# Patient Record
Sex: FEMALE | Race: WHITE | NOT HISPANIC OR LATINO | Employment: OTHER | URBAN - METROPOLITAN AREA
[De-identification: names, ages, dates, MRNs, and addresses within clinical notes are randomized per-mention and may not be internally consistent; named-entity substitution may affect disease eponyms.]

---

## 2018-10-16 ENCOUNTER — TRANSCRIBE ORDERS (OUTPATIENT)
Dept: ADMINISTRATIVE | Facility: HOSPITAL | Age: 83
End: 2018-10-16

## 2018-10-16 DIAGNOSIS — R22.31 MASS OF ARM, RIGHT: Primary | ICD-10-CM

## 2018-10-30 ENCOUNTER — HOSPITAL ENCOUNTER (OUTPATIENT)
Dept: MRI IMAGING | Facility: HOSPITAL | Age: 83
Discharge: HOME | End: 2018-10-30
Attending: PLASTIC SURGERY | Admitting: PLASTIC SURGERY

## 2018-10-30 DIAGNOSIS — R22.31 MASS OF ARM, RIGHT: ICD-10-CM

## 2018-10-30 PROCEDURE — 0 GADOBENATE DIMEGLUMINE 529 MG/ML SOLUTION: Performed by: PLASTIC SURGERY

## 2018-10-30 PROCEDURE — A9577 INJ MULTIHANCE: HCPCS | Performed by: PLASTIC SURGERY

## 2018-10-30 PROCEDURE — 73220 MRI UPPR EXTREMITY W/O&W/DYE: CPT

## 2018-10-30 PROCEDURE — 82565 ASSAY OF CREATININE: CPT

## 2018-10-30 RX ADMIN — GADOBENATE DIMEGLUMINE 10 ML: 529 INJECTION, SOLUTION INTRAVENOUS at 18:19

## 2018-11-01 LAB — CREAT BLDA-MCNC: 1.2 MG/DL (ref 0.6–1.3)

## 2018-11-03 ENCOUNTER — HOSPITAL ENCOUNTER (INPATIENT)
Facility: HOSPITAL | Age: 83
LOS: 4 days | Discharge: NURSING, REHAB OR LONG-TERM CARE | End: 2018-11-07
Attending: EMERGENCY MEDICINE | Admitting: HOSPITALIST

## 2018-11-03 ENCOUNTER — APPOINTMENT (OUTPATIENT)
Dept: GENERAL RADIOLOGY | Facility: HOSPITAL | Age: 83
End: 2018-11-03

## 2018-11-03 DIAGNOSIS — S72.002A CLOSED DISPLACED FRACTURE OF LEFT FEMORAL NECK (HCC): Primary | ICD-10-CM
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
## 2018-11-03 DIAGNOSIS — R26.2 DIFFICULTY WALKING: ICD-10-CM

## 2018-11-03 LAB
ALBUMIN SERPL-MCNC: 4.4 G/DL (ref 3.5–5.2)
ALBUMIN/GLOB SERPL: 1.4 G/DL
ALP SERPL-CCNC: 65 U/L (ref 39–117)
ALT SERPL W P-5'-P-CCNC: 26 U/L (ref 1–33)
ANION GAP SERPL CALCULATED.3IONS-SCNC: 12.8 MMOL/L
AST SERPL-CCNC: 25 U/L (ref 1–32)
BASOPHILS # BLD AUTO: 0.03 10*3/MM3 (ref 0–0.2)
BASOPHILS NFR BLD AUTO: 0.2 % (ref 0–1.5)
BILIRUB SERPL-MCNC: 0.4 MG/DL (ref 0.1–1.2)
BUN BLD-MCNC: 20 MG/DL (ref 8–23)
BUN/CREAT SERPL: 20 (ref 7–25)
CALCIUM SPEC-SCNC: 9.4 MG/DL (ref 8.6–10.5)
CHLORIDE SERPL-SCNC: 102 MMOL/L (ref 98–107)
CO2 SERPL-SCNC: 26.2 MMOL/L (ref 22–29)
CREAT BLD-MCNC: 1 MG/DL (ref 0.57–1)
DEPRECATED RDW RBC AUTO: 45.2 FL (ref 37–54)
EOSINOPHIL # BLD AUTO: 0.1 10*3/MM3 (ref 0–0.7)
EOSINOPHIL NFR BLD AUTO: 0.7 % (ref 0.3–6.2)
ERYTHROCYTE [DISTWIDTH] IN BLOOD BY AUTOMATED COUNT: 13.6 % (ref 11.7–13)
GFR SERPL CREATININE-BSD FRML MDRD: 53 ML/MIN/1.73
GLOBULIN UR ELPH-MCNC: 3.1 GM/DL
GLUCOSE BLD-MCNC: 113 MG/DL (ref 65–99)
HCT VFR BLD AUTO: 40.9 % (ref 35.6–45.5)
HGB BLD-MCNC: 13.5 G/DL (ref 11.9–15.5)
IMM GRANULOCYTES # BLD: 0.05 10*3/MM3 (ref 0–0.03)
IMM GRANULOCYTES NFR BLD: 0.4 % (ref 0–0.5)
INR PPP: 1.02 (ref 0.9–1.1)
LYMPHOCYTES # BLD AUTO: 1.12 10*3/MM3 (ref 0.9–4.8)
LYMPHOCYTES NFR BLD AUTO: 8 % (ref 19.6–45.3)
MCH RBC QN AUTO: 30.3 PG (ref 26.9–32)
MCHC RBC AUTO-ENTMCNC: 33 G/DL (ref 32.4–36.3)
MCV RBC AUTO: 91.9 FL (ref 80.5–98.2)
MONOCYTES # BLD AUTO: 0.79 10*3/MM3 (ref 0.2–1.2)
MONOCYTES NFR BLD AUTO: 5.6 % (ref 5–12)
NEUTROPHILS # BLD AUTO: 11.98 10*3/MM3 (ref 1.9–8.1)
NEUTROPHILS NFR BLD AUTO: 85.5 % (ref 42.7–76)
PLATELET # BLD AUTO: 277 10*3/MM3 (ref 140–500)
PMV BLD AUTO: 9.1 FL (ref 6–12)
POTASSIUM BLD-SCNC: 4 MMOL/L (ref 3.5–5.2)
PROT SERPL-MCNC: 7.5 G/DL (ref 6–8.5)
PROTHROMBIN TIME: 13.2 SECONDS (ref 11.7–14.2)
RBC # BLD AUTO: 4.45 10*6/MM3 (ref 3.9–5.2)
SODIUM BLD-SCNC: 141 MMOL/L (ref 136–145)
WBC NRBC COR # BLD: 14.02 10*3/MM3 (ref 4.5–10.7)

## 2018-11-03 PROCEDURE — 93005 ELECTROCARDIOGRAM TRACING: CPT | Performed by: EMERGENCY MEDICINE

## 2018-11-03 PROCEDURE — 73502 X-RAY EXAM HIP UNI 2-3 VIEWS: CPT

## 2018-11-03 PROCEDURE — 36415 COLL VENOUS BLD VENIPUNCTURE: CPT

## 2018-11-03 PROCEDURE — 73552 X-RAY EXAM OF FEMUR 2/>: CPT

## 2018-11-03 PROCEDURE — 71045 X-RAY EXAM CHEST 1 VIEW: CPT

## 2018-11-03 PROCEDURE — 25010000002 MORPHINE PER 10 MG: Performed by: EMERGENCY MEDICINE

## 2018-11-03 PROCEDURE — 85610 PROTHROMBIN TIME: CPT | Performed by: EMERGENCY MEDICINE

## 2018-11-03 PROCEDURE — 93010 ELECTROCARDIOGRAM REPORT: CPT | Performed by: INTERNAL MEDICINE

## 2018-11-03 PROCEDURE — 85025 COMPLETE CBC W/AUTO DIFF WBC: CPT | Performed by: EMERGENCY MEDICINE

## 2018-11-03 PROCEDURE — 73130 X-RAY EXAM OF HAND: CPT

## 2018-11-03 PROCEDURE — 80053 COMPREHEN METABOLIC PANEL: CPT | Performed by: EMERGENCY MEDICINE

## 2018-11-03 PROCEDURE — 99285 EMERGENCY DEPT VISIT HI MDM: CPT

## 2018-11-03 PROCEDURE — 25010000002 ONDANSETRON PER 1 MG: Performed by: EMERGENCY MEDICINE

## 2018-11-03 RX ORDER — ONDANSETRON 2 MG/ML
4 INJECTION INTRAMUSCULAR; INTRAVENOUS ONCE
Status: COMPLETED | OUTPATIENT
Start: 2018-11-03 | End: 2018-11-03

## 2018-11-03 RX ORDER — SODIUM CHLORIDE 0.9 % (FLUSH) 0.9 %
10 SYRINGE (ML) INJECTION AS NEEDED
Status: DISCONTINUED | OUTPATIENT
Start: 2018-11-03 | End: 2018-11-07

## 2018-11-03 RX ORDER — ATORVASTATIN CALCIUM 10 MG/1
10 TABLET, FILM COATED ORAL DAILY
COMMUNITY
End: 2019-06-25 | Stop reason: SDUPTHER

## 2018-11-03 RX ORDER — MORPHINE SULFATE 2 MG/ML
4 INJECTION, SOLUTION INTRAMUSCULAR; INTRAVENOUS ONCE
Status: COMPLETED | OUTPATIENT
Start: 2018-11-03 | End: 2018-11-03

## 2018-11-03 RX ORDER — CETIRIZINE HYDROCHLORIDE 10 MG/1
10 TABLET ORAL DAILY
COMMUNITY
End: 2019-12-23 | Stop reason: SDUPTHER

## 2018-11-03 RX ORDER — ASPIRIN 81 MG/1
81 TABLET, CHEWABLE ORAL DAILY
COMMUNITY
End: 2022-05-09

## 2018-11-03 RX ORDER — MORPHINE SULFATE 2 MG/ML
2 INJECTION, SOLUTION INTRAMUSCULAR; INTRAVENOUS ONCE
Status: COMPLETED | OUTPATIENT
Start: 2018-11-03 | End: 2018-11-03

## 2018-11-03 RX ORDER — METOCLOPRAMIDE HYDROCHLORIDE 5 MG/ML
10 INJECTION INTRAMUSCULAR; INTRAVENOUS ONCE
Status: DISCONTINUED | OUTPATIENT
Start: 2018-11-03 | End: 2018-11-03

## 2018-11-03 RX ADMIN — MORPHINE SULFATE 4 MG: 2 INJECTION, SOLUTION INTRAMUSCULAR; INTRAVENOUS at 19:09

## 2018-11-03 RX ADMIN — MORPHINE SULFATE 2 MG: 2 INJECTION, SOLUTION INTRAMUSCULAR; INTRAVENOUS at 22:55

## 2018-11-03 RX ADMIN — ONDANSETRON 4 MG: 2 INJECTION INTRAMUSCULAR; INTRAVENOUS at 19:09

## 2018-11-03 NOTE — ED PROVIDER NOTES
[facility] EMERGENCY DEPARTMENT ENCOUNTER    CHIEF COMPLAINT  Chief Complaint: leg pain  History given by: patient  History limited by: none  [facility] Room Number: 35/35  PMD: Thuy Garza MD      HPI:  Pt is a 83 y.o. female who presents complaining of LLE pain s/p fall down approximately 6 steps at 1630.  She states that she slipped and fell on to her left hip and her left hand but denies hitting her head.  She also c/o L hand pain. She denies LOC, abd pain, neck pain, and back pain.  She states that she has been unable to bear weight on her left and pulled herself up the stairs so that she could call for help.     Onset: sudden  Duration: pta  Severity: moderate  Associated symptoms: L hand pain  Previous treatment: none    PAST MEDICAL HISTORY  Active Ambulatory Problems     Diagnosis Date Noted   • No Active Ambulatory Problems     Resolved Ambulatory Problems     Diagnosis Date Noted   • No Resolved Ambulatory Problems     Past Medical History:   Diagnosis Date   • Hyperlipidemia        PAST SURGICAL HISTORY  Past Surgical History:   Procedure Laterality Date   • REPLACEMENT TOTAL HIP ONCOLOGIC Right        FAMILY HISTORY  History reviewed. No pertinent family history.    SOCIAL HISTORY  Social History     Social History   • Marital status:      Spouse name: N/A   • Number of children: N/A   • Years of education: N/A     Occupational History   • Not on file.     Social History Main Topics   • Smoking status: Never Smoker   • Smokeless tobacco: Never Used   • Alcohol use Not on file   • Drug use: Unknown   • Sexual activity: Not on file     Other Topics Concern   • Not on file     Social History Narrative   • No narrative on file       ALLERGIES  Patient has no known allergies.    REVIEW OF SYSTEMS  Review of Systems   Constitutional: Negative for fever.   HENT: Negative for sore throat.    Eyes: Negative.    Respiratory: Negative for cough and shortness of breath.    Cardiovascular: Negative for chest pain.    Gastrointestinal: Negative for abdominal pain, diarrhea and vomiting.   Genitourinary: Negative for dysuria.   Musculoskeletal: Negative for neck pain.        L hip and LLE pain   Skin: Negative for rash.   Allergic/Immunologic: Negative.    Neurological: Negative for weakness, numbness and headaches.   Hematological: Negative.    Psychiatric/Behavioral: Negative.    All other systems reviewed and are negative.      PHYSICAL EXAM  ED Triage Vitals   Temp Heart Rate Resp BP SpO2   11/03/18 1758 11/03/18 1758 11/03/18 1758 11/03/18 1758 11/03/18 1758   98.1 °F (36.7 °C) 80 16 158/84 94 %      Temp src Heart Rate Source Patient Position BP Location FiO2 (%)   11/03/18 1758 11/03/18 1812 11/03/18 1812 11/03/18 1812 --   Tympanic Monitor Lying Right arm        Physical Exam   Constitutional: She is oriented to person, place, and time. No distress.   HENT:   Head: Normocephalic and atraumatic.   Eyes: Pupils are equal, round, and reactive to light. EOM are normal.   Neck: Normal range of motion and full passive range of motion without pain. Neck supple. No spinous process tenderness and no muscular tenderness present.   Cardiovascular: Normal rate, regular rhythm and normal heart sounds.    No murmur heard.  Pulmonary/Chest: Effort normal and breath sounds normal. No respiratory distress.   Abdominal: Soft. Bowel sounds are normal. She exhibits no distension. There is no tenderness. There is no rebound and no guarding.   Musculoskeletal: Normal range of motion. She exhibits no edema.   L spine nontender. LLE is shortened and externally rotated. Tenderness to the lateral L hip and middle third of the L thigh. Pain w/ internal rotation and flexion of the L hip. L Hand there is bruising on the palmar aspect at the base of the thumb, no snuff box tenderness, and no pain w/ axial loading of the L thumb.   Neurological: She is alert and oriented to person, place, and time. She has normal sensation and normal strength.   Skin:  Skin is warm and dry. No rash noted.   Psychiatric: Mood and affect normal.   Nursing note and vitals reviewed.      LAB RESULTS  Lab Results (last 24 hours)     Procedure Component Value Units Date/Time    CBC & Differential [[id number]] Collected:  11/03/18 1951    Specimen:  Blood Updated:  11/03/18 2003    Narrative:       The following orders were created for panel order CBC & Differential.  Procedure                               Abnormality         Status                     ---------                               -----------         ------                     CBC Auto Differential[[id number]]        Abnormal            Final result                 Please view results for these tests on the individual orders.    Comprehensive Metabolic Panel [[id number]]  (Abnormal) Collected:  11/03/18 1951    Specimen:  Blood from Arm, Right Updated:  11/03/18 2029     Glucose 113 (H) mg/dL      BUN 20 mg/dL      Creatinine 1.00 mg/dL      Sodium 141 mmol/L      Potassium 4.0 mmol/L      Chloride 102 mmol/L      CO2 26.2 mmol/L      Calcium 9.4 mg/dL      Total Protein 7.5 g/dL      Albumin 4.40 g/dL      ALT (SGPT) 26 U/L      AST (SGOT) 25 U/L      Alkaline Phosphatase 65 U/L      Total Bilirubin 0.4 mg/dL      eGFR Non African Amer 53 (L) mL/min/1.73      Globulin 3.1 gm/dL      A/G Ratio 1.4 g/dL      BUN/Creatinine Ratio 20.0     Anion Gap 12.8 mmol/L     Narrative:       The MDRD GFR formula is only valid for adults with stable renal function between ages 18 and 70.    Protime-INR [[id number]]  (Normal) Collected:  11/03/18 1951    Specimen:  Blood from Arm, Right Updated:  11/03/18 2012     Protime 13.2 Seconds      INR 1.02    CBC Auto Differential [[id number]]  (Abnormal) Collected:  11/03/18 1951    Specimen:  Blood from Arm, Right Updated:  11/03/18 2003     WBC 14.02 (H) 10*3/mm3      RBC 4.45 10*6/mm3      Hemoglobin 13.5 g/dL      Hematocrit 40.9 %      MCV 91.9 fL      MCH 30.3 pg      MCHC 33.0 g/dL      RDW 13.6  (H) %      RDW-SD 45.2 fl      MPV 9.1 fL      Platelets 277 10*3/mm3      Neutrophil % 85.5 (H) %      Lymphocyte % 8.0 (L) %      Monocyte % 5.6 %      Eosinophil % 0.7 %      Basophil % 0.2 %      Immature Grans % 0.4 %      Neutrophils, Absolute 11.98 (H) 10*3/mm3      Lymphocytes, Absolute 1.12 10*3/mm3      Monocytes, Absolute 0.79 10*3/mm3      Eosinophils, Absolute 0.10 10*3/mm3      Basophils, Absolute 0.03 10*3/mm3      Immature Grans, Absolute 0.05 (H) 10*3/mm3           I ordered the above labs and reviewed the results    RADIOLOGY  XR Femur 2 View Left   Final Result   An AP view the pelvis and AP and cross table lateral views of the left  hip and femur were obtained. There is an acute subcapital fracture of  the left femoral neck. The distal femur and the left acetabulum appear  intact. A bipolar right hip arthroplasty is intact.      XR Hip With or Without Pelvis 2 - 3 View Left   Final Result      XR Chest 1 View   Final Result   2 supine views were obtained. The lungs are somewhat poorly inflated but  appear free of focal infiltrates. There are no pleural effusions. The  heart is normal in size.   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 There is also  marked narrowing of the joint space between the trapezium and scaphoid  bone. No acute fractures are identified. An old impacted fracture of the  distal radial metaphysis is suspected.        I ordered the above noted radiological studies. Interpreted by radiologist. Reviewed by me in PACS.       PROCEDURES  Procedures  EKG          EKG time: 1904  Rhythm/Rate: NSR 73  P waves and SD: nml  QRS, axis: RBBB   ST and T waves: RBBB     Interpreted Contemporaneously by me, independently viewed  No prior for comparison.      PROGRESS AND CONSULTS     2005  BP- 146/79 HR- 72 Temp- 98.1 °F (36.7 °C) (Tympanic) O2 sat- 98%  Rechecked  the patient who is in NAD and is resting comfortably. Discussed imaging showing L femoral neck fx and the plan to consult w/ ortho and admit. Pt understands and agrees with the plan, all questions answered.    2009  Discussed the pt with DEBI Mccarty. He agreed to admit.    MEDICAL DECISION MAKING  Results were reviewed/discussed with the patient and they were also made aware of online access. Pt also made aware that some labs, such as cultures, will not be resulted during ER visit and follow up with PMD is necessary.     MDM  Number of Diagnoses or Management Options  Closed displaced fracture of left femoral neck (CMS/HCC):   Fall, initial encounter:      Amount and/or Complexity of Data Reviewed  Clinical lab tests: reviewed (WBC 14.02)  Tests in the radiology section of CPT®: reviewed (XR Femur and hip-L femoral neck fx  CXR-negative  L Hand XR-negative acute)  Tests in the medicine section of CPT®: reviewed (See EKG procedure note.)  Discuss the patient with other providers: yes (DEBI Mccarty)  Independent visualization of images, tracings, or specimens: yes    Patient Progress  Patient progress: stable         DIAGNOSIS  Final diagnoses:   Closed displaced fracture of left femoral neck (CMS/HCC)   Fall, initial encounter       DISPOSITION  ADMISSION    Discussed treatment plan and reason for admission with pt/family and admitting physician.  Pt/family voiced understanding of the plan for admission for further testing/treatment as needed.     Latest Documented Vital Signs:  As of 8:50 PM  BP- 135/78 HR- 72 Temp- 98.1 °F (36.7 °C) (Tympanic) O2 sat- 90%    --  Documentation assistance provided by macarena Dunn for Dr. Elizabeth.  Information recorded by the scrjimmye was done at my direction and has been verified and validated by me.     Kathe Dunn  11/03/18 2112       Sunil Elizabeth MD  11/03/18 4937

## 2018-11-04 ENCOUNTER — APPOINTMENT (OUTPATIENT)
Dept: GENERAL RADIOLOGY | Facility: HOSPITAL | Age: 83
End: 2018-11-04

## 2018-11-04 ENCOUNTER — ANESTHESIA EVENT (OUTPATIENT)
Dept: PERIOP | Facility: HOSPITAL | Age: 83
End: 2018-11-04

## 2018-11-04 ENCOUNTER — ANESTHESIA (OUTPATIENT)
Dept: PERIOP | Facility: HOSPITAL | Age: 83
End: 2018-11-04

## 2018-11-04 PROCEDURE — C1713 ANCHOR/SCREW BN/BN,TIS/BN: HCPCS | Performed by: ORTHOPAEDIC SURGERY

## 2018-11-04 PROCEDURE — 25010000003 CEFAZOLIN IN DEXTROSE 2-4 GM/100ML-% SOLUTION: Performed by: ORTHOPAEDIC SURGERY

## 2018-11-04 PROCEDURE — 25810000003 SODIUM CHLORIDE 0.9 % WITH KCL 20 MEQ 20-0.9 MEQ/L-% SOLUTION: Performed by: HOSPITALIST

## 2018-11-04 PROCEDURE — 25010000003 CEFAZOLIN IN DEXTROSE 2-4 GM/100ML-% SOLUTION: Performed by: PHYSICIAN ASSISTANT

## 2018-11-04 PROCEDURE — 25010000002 ONDANSETRON PER 1 MG: Performed by: HOSPITALIST

## 2018-11-04 PROCEDURE — 25010000002 MORPHINE PER 10 MG: Performed by: HOSPITALIST

## 2018-11-04 PROCEDURE — 25010000002 HYDROMORPHONE PER 4 MG: Performed by: NURSE ANESTHETIST, CERTIFIED REGISTERED

## 2018-11-04 PROCEDURE — C1713 ANCHOR/SCREW BN/BN,TIS/BN: HCPCS

## 2018-11-04 PROCEDURE — 0SRS0J9 REPLACEMENT OF LEFT HIP JOINT, FEMORAL SURFACE WITH SYNTHETIC SUBSTITUTE, CEMENTED, OPEN APPROACH: ICD-10-PCS | Performed by: ORTHOPAEDIC SURGERY

## 2018-11-04 PROCEDURE — 72170 X-RAY EXAM OF PELVIS: CPT

## 2018-11-04 PROCEDURE — 25010000002 DEXAMETHASONE PER 1 MG: Performed by: NURSE ANESTHETIST, CERTIFIED REGISTERED

## 2018-11-04 PROCEDURE — 25010000002 ONDANSETRON PER 1 MG: Performed by: NURSE ANESTHETIST, CERTIFIED REGISTERED

## 2018-11-04 PROCEDURE — C1776 JOINT DEVICE (IMPLANTABLE): HCPCS | Performed by: ORTHOPAEDIC SURGERY

## 2018-11-04 PROCEDURE — 25010000002 FENTANYL CITRATE (PF) 100 MCG/2ML SOLUTION: Performed by: NURSE ANESTHETIST, CERTIFIED REGISTERED

## 2018-11-04 PROCEDURE — 25010000002 PROPOFOL 10 MG/ML EMULSION: Performed by: NURSE ANESTHETIST, CERTIFIED REGISTERED

## 2018-11-04 DEVICE — CENTRLZR DIST VERSYS 10MM: Type: IMPLANTABLE DEVICE | Site: HIP | Status: FUNCTIONAL

## 2018-11-04 DEVICE — SUT FW #2 W/TPR NDL 1/2 CIR 38IN 97CM 26.5MM BLU: Type: IMPLANTABLE DEVICE | Site: HIP | Status: FUNCTIONAL

## 2018-11-04 DEVICE — CAP PRT HIP BIPOL FX: Type: IMPLANTABLE DEVICE | Site: HIP | Status: FUNCTIONAL

## 2018-11-04 DEVICE — LINER VERSYS ASMBL POLY 44/45/46MM OD X28MM: Type: IMPLANTABLE DEVICE | Site: HIP | Status: FUNCTIONAL

## 2018-11-04 DEVICE — CMT BONE PALACOS RPLSG W/GENT HI/VISC 1X40: Type: IMPLANTABLE DEVICE | Site: HIP | Status: FUNCTIONAL

## 2018-11-04 DEVICE — HD FEM/HIP VERSYS COCR 12/14TPR 28MM PLS3.5MM: Type: IMPLANTABLE DEVICE | Site: HIP | Status: FUNCTIONAL

## 2018-11-04 DEVICE — SHLL VERSYS M/BIPOL MTL OD 46MM: Type: IMPLANTABLE DEVICE | Site: HIP | Status: FUNCTIONAL

## 2018-11-04 DEVICE — STEM FEM/HIP VERSYS ADVOCATE NONVLIGN STD/OFFST SZ12 125MM: Type: IMPLANTABLE DEVICE | Site: HIP | Status: FUNCTIONAL

## 2018-11-04 RX ORDER — NALOXONE HCL 0.4 MG/ML
0.2 VIAL (ML) INJECTION AS NEEDED
Status: DISCONTINUED | OUTPATIENT
Start: 2018-11-04 | End: 2018-11-04

## 2018-11-04 RX ORDER — PROMETHAZINE HYDROCHLORIDE 25 MG/ML
12.5 INJECTION, SOLUTION INTRAMUSCULAR; INTRAVENOUS ONCE AS NEEDED
Status: DISCONTINUED | OUTPATIENT
Start: 2018-11-04 | End: 2018-11-04

## 2018-11-04 RX ORDER — ONDANSETRON 2 MG/ML
INJECTION INTRAMUSCULAR; INTRAVENOUS AS NEEDED
Status: DISCONTINUED | OUTPATIENT
Start: 2018-11-04 | End: 2018-11-04 | Stop reason: SURG

## 2018-11-04 RX ORDER — PROMETHAZINE HYDROCHLORIDE 25 MG/1
25 TABLET ORAL ONCE AS NEEDED
Status: DISCONTINUED | OUTPATIENT
Start: 2018-11-04 | End: 2018-11-04

## 2018-11-04 RX ORDER — CETIRIZINE HYDROCHLORIDE 10 MG/1
10 TABLET ORAL DAILY
Status: DISCONTINUED | OUTPATIENT
Start: 2018-11-04 | End: 2018-11-07 | Stop reason: HOSPADM

## 2018-11-04 RX ORDER — SODIUM CHLORIDE 0.9 % (FLUSH) 0.9 %
3 SYRINGE (ML) INJECTION EVERY 12 HOURS SCHEDULED
Status: DISCONTINUED | OUTPATIENT
Start: 2018-11-04 | End: 2018-11-04

## 2018-11-04 RX ORDER — SENNA AND DOCUSATE SODIUM 50; 8.6 MG/1; MG/1
2 TABLET, FILM COATED ORAL NIGHTLY
Status: DISCONTINUED | OUTPATIENT
Start: 2018-11-04 | End: 2018-11-07 | Stop reason: HOSPADM

## 2018-11-04 RX ORDER — MORPHINE SULFATE 2 MG/ML
1 INJECTION, SOLUTION INTRAMUSCULAR; INTRAVENOUS EVERY 4 HOURS PRN
Status: DISCONTINUED | OUTPATIENT
Start: 2018-11-04 | End: 2018-11-06

## 2018-11-04 RX ORDER — ONDANSETRON 2 MG/ML
4 INJECTION INTRAMUSCULAR; INTRAVENOUS ONCE
Status: DISCONTINUED | OUTPATIENT
Start: 2018-11-04 | End: 2018-11-04

## 2018-11-04 RX ORDER — HYDROCODONE BITARTRATE AND ACETAMINOPHEN 5; 325 MG/1; MG/1
1 TABLET ORAL EVERY 4 HOURS PRN
Status: DISCONTINUED | OUTPATIENT
Start: 2018-11-04 | End: 2018-11-07 | Stop reason: HOSPADM

## 2018-11-04 RX ORDER — FLUMAZENIL 0.1 MG/ML
0.2 INJECTION INTRAVENOUS AS NEEDED
Status: DISCONTINUED | OUTPATIENT
Start: 2018-11-04 | End: 2018-11-04

## 2018-11-04 RX ORDER — FENTANYL CITRATE 50 UG/ML
INJECTION, SOLUTION INTRAMUSCULAR; INTRAVENOUS AS NEEDED
Status: DISCONTINUED | OUTPATIENT
Start: 2018-11-04 | End: 2018-11-04 | Stop reason: SURG

## 2018-11-04 RX ORDER — ATORVASTATIN CALCIUM 10 MG/1
10 TABLET, FILM COATED ORAL DAILY
Status: DISCONTINUED | OUTPATIENT
Start: 2018-11-04 | End: 2018-11-07 | Stop reason: HOSPADM

## 2018-11-04 RX ORDER — LIDOCAINE HYDROCHLORIDE 20 MG/ML
INJECTION, SOLUTION INFILTRATION; PERINEURAL AS NEEDED
Status: DISCONTINUED | OUTPATIENT
Start: 2018-11-04 | End: 2018-11-04 | Stop reason: SURG

## 2018-11-04 RX ORDER — MORPHINE SULFATE 2 MG/ML
2 INJECTION, SOLUTION INTRAMUSCULAR; INTRAVENOUS
Status: DISCONTINUED | OUTPATIENT
Start: 2018-11-04 | End: 2018-11-06

## 2018-11-04 RX ORDER — SODIUM CHLORIDE, SODIUM LACTATE, POTASSIUM CHLORIDE, CALCIUM CHLORIDE 600; 310; 30; 20 MG/100ML; MG/100ML; MG/100ML; MG/100ML
9 INJECTION, SOLUTION INTRAVENOUS CONTINUOUS
Status: DISCONTINUED | OUTPATIENT
Start: 2018-11-04 | End: 2018-11-04

## 2018-11-04 RX ORDER — LABETALOL HYDROCHLORIDE 5 MG/ML
5 INJECTION, SOLUTION INTRAVENOUS
Status: DISCONTINUED | OUTPATIENT
Start: 2018-11-04 | End: 2018-11-04

## 2018-11-04 RX ORDER — HYDROCODONE BITARTRATE AND ACETAMINOPHEN 7.5; 325 MG/1; MG/1
1 TABLET ORAL ONCE AS NEEDED
Status: DISCONTINUED | OUTPATIENT
Start: 2018-11-04 | End: 2018-11-04

## 2018-11-04 RX ORDER — NALOXONE HCL 0.4 MG/ML
0.4 VIAL (ML) INJECTION
Status: DISCONTINUED | OUTPATIENT
Start: 2018-11-04 | End: 2018-11-06

## 2018-11-04 RX ORDER — MORPHINE SULFATE 2 MG/ML
2 INJECTION, SOLUTION INTRAMUSCULAR; INTRAVENOUS EVERY 4 HOURS PRN
Status: DISCONTINUED | OUTPATIENT
Start: 2018-11-04 | End: 2018-11-06

## 2018-11-04 RX ORDER — DIPHENHYDRAMINE HYDROCHLORIDE 50 MG/ML
12.5 INJECTION INTRAMUSCULAR; INTRAVENOUS
Status: DISCONTINUED | OUTPATIENT
Start: 2018-11-04 | End: 2018-11-04

## 2018-11-04 RX ORDER — OXYCODONE AND ACETAMINOPHEN 7.5; 325 MG/1; MG/1
1 TABLET ORAL ONCE AS NEEDED
Status: DISCONTINUED | OUTPATIENT
Start: 2018-11-04 | End: 2018-11-04

## 2018-11-04 RX ORDER — ONDANSETRON 2 MG/ML
4 INJECTION INTRAMUSCULAR; INTRAVENOUS ONCE AS NEEDED
Status: DISCONTINUED | OUTPATIENT
Start: 2018-11-04 | End: 2018-11-04

## 2018-11-04 RX ORDER — MORPHINE SULFATE 2 MG/ML
2 INJECTION, SOLUTION INTRAMUSCULAR; INTRAVENOUS ONCE
Status: DISCONTINUED | OUTPATIENT
Start: 2018-11-04 | End: 2018-11-04

## 2018-11-04 RX ORDER — DEXTROSE, SODIUM CHLORIDE, AND POTASSIUM CHLORIDE 5; .45; .15 G/100ML; G/100ML; G/100ML
75 INJECTION INTRAVENOUS CONTINUOUS
Status: DISCONTINUED | OUTPATIENT
Start: 2018-11-04 | End: 2018-11-05

## 2018-11-04 RX ORDER — PROMETHAZINE HYDROCHLORIDE 25 MG/1
12.5 TABLET ORAL ONCE AS NEEDED
Status: DISCONTINUED | OUTPATIENT
Start: 2018-11-04 | End: 2018-11-04

## 2018-11-04 RX ORDER — PROPOFOL 10 MG/ML
VIAL (ML) INTRAVENOUS AS NEEDED
Status: DISCONTINUED | OUTPATIENT
Start: 2018-11-04 | End: 2018-11-04 | Stop reason: SURG

## 2018-11-04 RX ORDER — HYDROCODONE BITARTRATE AND ACETAMINOPHEN 5; 325 MG/1; MG/1
2 TABLET ORAL EVERY 4 HOURS PRN
Status: DISCONTINUED | OUTPATIENT
Start: 2018-11-04 | End: 2018-11-07

## 2018-11-04 RX ORDER — PROMETHAZINE HYDROCHLORIDE 25 MG/1
25 SUPPOSITORY RECTAL ONCE AS NEEDED
Status: DISCONTINUED | OUTPATIENT
Start: 2018-11-04 | End: 2018-11-04

## 2018-11-04 RX ORDER — FAMOTIDINE 10 MG/ML
20 INJECTION, SOLUTION INTRAVENOUS ONCE
Status: COMPLETED | OUTPATIENT
Start: 2018-11-04 | End: 2018-11-04

## 2018-11-04 RX ORDER — SODIUM CHLORIDE AND POTASSIUM CHLORIDE 150; 900 MG/100ML; MG/100ML
75 INJECTION, SOLUTION INTRAVENOUS CONTINUOUS
Status: DISCONTINUED | OUTPATIENT
Start: 2018-11-04 | End: 2018-11-05

## 2018-11-04 RX ORDER — CEFAZOLIN SODIUM 2 G/100ML
2 INJECTION, SOLUTION INTRAVENOUS ONCE
Status: COMPLETED | OUTPATIENT
Start: 2018-11-04 | End: 2018-11-04

## 2018-11-04 RX ORDER — FENTANYL CITRATE 50 UG/ML
50 INJECTION, SOLUTION INTRAMUSCULAR; INTRAVENOUS
Status: DISCONTINUED | OUTPATIENT
Start: 2018-11-04 | End: 2018-11-04

## 2018-11-04 RX ORDER — PANTOPRAZOLE SODIUM 40 MG/1
40 TABLET, DELAYED RELEASE ORAL
Status: DISCONTINUED | OUTPATIENT
Start: 2018-11-04 | End: 2018-11-07 | Stop reason: HOSPADM

## 2018-11-04 RX ORDER — MAGNESIUM HYDROXIDE 1200 MG/15ML
LIQUID ORAL AS NEEDED
Status: DISCONTINUED | OUTPATIENT
Start: 2018-11-04 | End: 2018-11-04 | Stop reason: HOSPADM

## 2018-11-04 RX ORDER — FENTANYL CITRATE 50 UG/ML
25 INJECTION, SOLUTION INTRAMUSCULAR; INTRAVENOUS
Status: DISCONTINUED | OUTPATIENT
Start: 2018-11-04 | End: 2018-11-04

## 2018-11-04 RX ORDER — SODIUM CHLORIDE 0.9 % (FLUSH) 0.9 %
3-10 SYRINGE (ML) INJECTION AS NEEDED
Status: DISCONTINUED | OUTPATIENT
Start: 2018-11-04 | End: 2018-11-04

## 2018-11-04 RX ORDER — ONDANSETRON 2 MG/ML
4 INJECTION INTRAMUSCULAR; INTRAVENOUS EVERY 6 HOURS PRN
Status: DISCONTINUED | OUTPATIENT
Start: 2018-11-04 | End: 2018-11-07

## 2018-11-04 RX ORDER — LIDOCAINE HYDROCHLORIDE 40 MG/ML
SOLUTION TOPICAL
Status: DISPENSED
Start: 2018-11-04 | End: 2018-11-05

## 2018-11-04 RX ORDER — MORPHINE SULFATE 2 MG/ML
4 INJECTION, SOLUTION INTRAMUSCULAR; INTRAVENOUS ONCE
Status: DISCONTINUED | OUTPATIENT
Start: 2018-11-04 | End: 2018-11-04

## 2018-11-04 RX ORDER — ASPIRIN 81 MG/1
81 TABLET, CHEWABLE ORAL DAILY
Status: DISCONTINUED | OUTPATIENT
Start: 2018-11-04 | End: 2018-11-07 | Stop reason: HOSPADM

## 2018-11-04 RX ORDER — CEFAZOLIN SODIUM 2 G/100ML
2 INJECTION, SOLUTION INTRAVENOUS EVERY 8 HOURS
Status: COMPLETED | OUTPATIENT
Start: 2018-11-04 | End: 2018-11-05

## 2018-11-04 RX ORDER — ROCURONIUM BROMIDE 10 MG/ML
INJECTION, SOLUTION INTRAVENOUS AS NEEDED
Status: DISCONTINUED | OUTPATIENT
Start: 2018-11-04 | End: 2018-11-04 | Stop reason: SURG

## 2018-11-04 RX ORDER — DEXAMETHASONE SODIUM PHOSPHATE 10 MG/ML
INJECTION INTRAMUSCULAR; INTRAVENOUS AS NEEDED
Status: DISCONTINUED | OUTPATIENT
Start: 2018-11-04 | End: 2018-11-04 | Stop reason: SURG

## 2018-11-04 RX ORDER — MORPHINE SULFATE 2 MG/ML
2 INJECTION, SOLUTION INTRAMUSCULAR; INTRAVENOUS EVERY 4 HOURS PRN
Status: DISCONTINUED | OUTPATIENT
Start: 2018-11-04 | End: 2018-11-04

## 2018-11-04 RX ORDER — EPHEDRINE SULFATE 50 MG/ML
5 INJECTION, SOLUTION INTRAVENOUS ONCE AS NEEDED
Status: DISCONTINUED | OUTPATIENT
Start: 2018-11-04 | End: 2018-11-04

## 2018-11-04 RX ORDER — HYDROMORPHONE HYDROCHLORIDE 1 MG/ML
0.5 INJECTION, SOLUTION INTRAMUSCULAR; INTRAVENOUS; SUBCUTANEOUS
Status: DISCONTINUED | OUTPATIENT
Start: 2018-11-04 | End: 2018-11-04

## 2018-11-04 RX ORDER — FENTANYL CITRATE 50 UG/ML
INJECTION, SOLUTION INTRAMUSCULAR; INTRAVENOUS
Status: COMPLETED
Start: 2018-11-04 | End: 2018-11-04

## 2018-11-04 RX ADMIN — FENTANYL CITRATE 25 MCG: 50 INJECTION INTRAMUSCULAR; INTRAVENOUS at 15:59

## 2018-11-04 RX ADMIN — HYDROCODONE BITARTRATE AND ACETAMINOPHEN 1 TABLET: 5; 325 TABLET ORAL at 21:51

## 2018-11-04 RX ADMIN — POTASSIUM CHLORIDE, DEXTROSE MONOHYDRATE AND SODIUM CHLORIDE 75 ML/HR: 150; 5; 450 INJECTION, SOLUTION INTRAVENOUS at 22:04

## 2018-11-04 RX ADMIN — ROCURONIUM BROMIDE 30 MG: 10 INJECTION INTRAVENOUS at 14:24

## 2018-11-04 RX ADMIN — FENTANYL CITRATE 50 MCG: 50 INJECTION INTRAMUSCULAR; INTRAVENOUS at 14:30

## 2018-11-04 RX ADMIN — SUGAMMADEX 100 MG: 100 INJECTION, SOLUTION INTRAVENOUS at 15:54

## 2018-11-04 RX ADMIN — CEFAZOLIN SODIUM 2 G: 2 INJECTION, SOLUTION INTRAVENOUS at 14:28

## 2018-11-04 RX ADMIN — MORPHINE SULFATE 2 MG: 2 INJECTION, SOLUTION INTRAMUSCULAR; INTRAVENOUS at 03:45

## 2018-11-04 RX ADMIN — PROPOFOL 100 MG: 10 INJECTION, EMULSION INTRAVENOUS at 14:24

## 2018-11-04 RX ADMIN — FENTANYL CITRATE 25 MCG: 50 INJECTION INTRAMUSCULAR; INTRAVENOUS at 15:54

## 2018-11-04 RX ADMIN — MORPHINE SULFATE 2 MG: 2 INJECTION, SOLUTION INTRAMUSCULAR; INTRAVENOUS at 09:37

## 2018-11-04 RX ADMIN — ONDANSETRON 4 MG: 2 INJECTION INTRAMUSCULAR; INTRAVENOUS at 15:43

## 2018-11-04 RX ADMIN — ONDANSETRON 4 MG: 2 INJECTION INTRAMUSCULAR; INTRAVENOUS at 17:36

## 2018-11-04 RX ADMIN — LIDOCAINE HYDROCHLORIDE 60 MG: 20 INJECTION, SOLUTION INFILTRATION; PERINEURAL at 14:24

## 2018-11-04 RX ADMIN — FENTANYL CITRATE 50 MCG: 50 INJECTION INTRAMUSCULAR; INTRAVENOUS at 14:24

## 2018-11-04 RX ADMIN — CEFAZOLIN SODIUM 2 G: 2 INJECTION, SOLUTION INTRAVENOUS at 23:00

## 2018-11-04 RX ADMIN — ATORVASTATIN CALCIUM 10 MG: 10 TABLET, FILM COATED ORAL at 21:51

## 2018-11-04 RX ADMIN — DOCUSATE SODIUM -SENNOSIDES 2 TABLET: 50; 8.6 TABLET, COATED ORAL at 21:51

## 2018-11-04 RX ADMIN — DEXAMETHASONE SODIUM PHOSPHATE 8 MG: 10 INJECTION INTRAMUSCULAR; INTRAVENOUS at 14:46

## 2018-11-04 RX ADMIN — HYDROMORPHONE HYDROCHLORIDE 0.5 MG: 1 INJECTION, SOLUTION INTRAMUSCULAR; INTRAVENOUS; SUBCUTANEOUS at 16:41

## 2018-11-04 RX ADMIN — FAMOTIDINE 20 MG: 10 INJECTION, SOLUTION INTRAVENOUS at 14:02

## 2018-11-04 RX ADMIN — SODIUM CHLORIDE, POTASSIUM CHLORIDE, SODIUM LACTATE AND CALCIUM CHLORIDE 9 ML/HR: 600; 310; 30; 20 INJECTION, SOLUTION INTRAVENOUS at 14:02

## 2018-11-04 NOTE — CONSULTS
Orthopaedic Consult      Patient: Tereso Allan    Date of Admission: 11/3/2018  6:01 PM    YOB: 1935    Medical Record Number: [id number]    Attending Physician: Thai Serna MD  Consulting Physician: Otoniel Stanley PA-C      Chief Complaints: Fall, initial encounter [W19.XXXA]  Closed displaced fracture of left femoral neck (CMS/[facility]) [S72.002A]      History of Present Illness: Patient is an 83-year-old female.  She is status post fall.  She states that she was walking up the steps.  She fell about 6 steps when trying to pull an air mattress up the steps.  She had acute left hip pain.  She also had some pain within her left hand.  She was unable to bear weight immediately.  She actually pulled herself up the steps.  She was brought to [facility].  She was found to have a left femoral neck fracture.  Orthopedics has been consults it for other treatment.  Patient has remained nothing by mouth.  She had she does have previous history of right bipolar hemiarthroplasty in 2009.  This was after a fall on ice as well.    Allergies: No Known Allergies    Medications:   Home Medications:  Prescriptions Prior to Admission   Medication Sig Dispense Refill Last Dose   • aspirin 81 MG chewable tablet Chew 81 mg Daily.      • atorvastatin (LIPITOR) 10 MG tablet Take 10 mg by mouth Daily.      • cetirizine (zyrTEC) 10 MG tablet Take 10 mg by mouth Daily.          Current Medications:  Scheduled Meds:  aspirin 81 mg Oral Daily   atorvastatin 10 mg Oral Daily   cetirizine 10 mg Oral Daily   pantoprazole 40 mg Oral Q AM     Continuous Infusions:  sodium chloride 0.9 % with KCl 20 mEq 75 mL/hr     PRN Meds:.Morphine **OR** Morphine  •  ondansetron  •  [COMPLETED] Insert peripheral IV **AND** sodium chloride    Past Medical History:   Diagnosis Date   • Elevated cholesterol    • Hyperlipidemia      Past Surgical History:   Procedure Laterality Date   • APPENDECTOMY     • COLONOSCOPY     • EYE SURGERY     •  FRACTURE SURGERY     • JOINT REPLACEMENT     • REPLACEMENT TOTAL HIP ONCOLOGIC Right      Social History     Occupational History   • Not on file.     Social History Main Topics   • Smoking status: Never Smoker   • Smokeless tobacco: Never Used   • Alcohol use No   • Drug use: No   • Sexual activity: Defer    Social History     Social History Narrative   • No narrative on file     History reviewed. No pertinent family history.      Review of Systems:   Constitutional: Negative for fatigue, fever, or weight loss  HEENT: Patient denies any headaches, vision changes, sore throat. Admits to wearing glasses  Pulmonary: Patient denies any cough, congestion, SOA, or wheezing  Cardiovascular: Patient denies any chest pain, palpitations, weakness,  Gastrointestinal:  Patient denies nausea, vomiting, diarrhea, constipation  Genital/Urinary: Patient denies dysuria, urinary frequency, urgency, incontinence, retention  Musculoskeletal: Positive for left hand and left hip pain. Negative for muscle cramps  Neurological: Patient denies dizziness, paresthesia, loss of sensation, seizures, syncope  Endocrine system: Patient denies tremors, cold or heat intolerance  Psychological: Patient denies thoughts/plans or harming self or other; hallucinations,  insomnia  Skin: Patient denies any bruising, rashes, lesions, or ulcers   Hematopoietic: Patient denies history of MRSA or slow wound healing,  spontaneous or excessive bleeding    Physical Exam: 83 y.o. female  Vitals:    11/03/18 2302 11/03/18 2355 11/04/18 0336 11/04/18 0718   BP: 130/72 140/74 123/76 107/65   BP Location: Right arm Right arm Right arm Left arm   Patient Position: Lying Lying Lying Lying   Pulse:  75 83 83   Resp:  16 16 16   Temp:  98.5 °F (36.9 °C) 98.6 °F (37 °C) 97.5 °F (36.4 °C)   TempSrc:  Oral Oral Oral   SpO2:  91% 92% 92%   Weight:       Height:                                General Appearance:  Alert, cooperative, in no acute distress    HEENT:     Normocephalic,  Atraumatic, Pupils are equal   Neck:   No adenopathy, supple, trachea midline, no thyromegaly       Lungs:     Clear to auscultation, equal expansion bilaterally, respirations regular, even and               unlabored    Heart:    Regular rhythm and normal rate, normal S1 and S2, no murmur, no gallops   Chest Wall:    Within normal limits   Abdomen:     Normal bowel sounds, no masses,  soft non-tender, non-distended,       Rectal:  Musculoskeletal:     Deferred    Focused physical examination of the left lower shoulder was performed.  No overlying skin changes.  No ecchymosis noted.  Left lower extremity is shortened and externally rotated.  Diffuse tenderness.  Pain with log roll.  EHL, FHL, TA, GS are intact.    Compartments are soft.  Distal pedal pulses are 2+.     Extremities:   No clubbing, no edema, no cyanosis   Pulses  Neurovascular:   Pulses palpable and equal bilaterally in all 4 extremities    Patient was alert and oriented x 3 spheres   Skin:   No lesions, ulcers or rashes   Neurologic:   Cranial nerves 2 - 12 grossly intact, sensation intact            Diagnostic Tests:    Admission on 11/03/2018   Component Date Value Ref Range Status   • Glucose 11/03/2018 113* 65 - 99 mg/dL Final   • BUN 11/03/2018 20  8 - 23 mg/dL Final   • Creatinine 11/03/2018 1.00  0.57 - 1.00 mg/dL Final   • Sodium 11/03/2018 141  136 - 145 mmol/L Final   • Potassium 11/03/2018 4.0  3.5 - 5.2 mmol/L Final   • Chloride 11/03/2018 102  98 - 107 mmol/L Final   • CO2 11/03/2018 26.2  22.0 - 29.0 mmol/L Final   • Calcium 11/03/2018 9.4  8.6 - 10.5 mg/dL Final   • Total Protein 11/03/2018 7.5  6.0 - 8.5 g/dL Final   • Albumin 11/03/2018 4.40  3.50 - 5.20 g/dL Final   • ALT (SGPT) 11/03/2018 26  1 - 33 U/L Final   • AST (SGOT) 11/03/2018 25  1 - 32 U/L Final   • Alkaline Phosphatase 11/03/2018 65  39 - 117 U/L Final   • Total Bilirubin 11/03/2018 0.4  0.1 - 1.2 mg/dL Final   • eGFR Non African Amer 11/03/2018 53* >60  mL/min/1.73 Final   • Globulin 11/03/2018 3.1  gm/dL Final   • A/G Ratio 11/03/2018 1.4  g/dL Final   • BUN/Creatinine Ratio 11/03/2018 20.0  7.0 - 25.0 Final   • Anion Gap 11/03/2018 12.8  mmol/L Final   • Protime 11/03/2018 13.2  11.7 - 14.2 Seconds Final   • INR 11/03/2018 1.02  0.90 - 1.10 Final   • WBC 11/03/2018 14.02* 4.50 - 10.70 10*3/mm3 Final   • RBC 11/03/2018 4.45  3.90 - 5.20 10*6/mm3 Final   • Hemoglobin 11/03/2018 13.5  11.9 - 15.5 g/dL Final   • Hematocrit 11/03/2018 40.9  35.6 - 45.5 % Final   • MCV 11/03/2018 91.9  80.5 - 98.2 fL Final   • MCH 11/03/2018 30.3  26.9 - 32.0 pg Final   • MCHC 11/03/2018 33.0  32.4 - 36.3 g/dL Final   • RDW 11/03/2018 13.6* 11.7 - 13.0 % Final   • RDW-SD 11/03/2018 45.2  37.0 - 54.0 fl Final   • MPV 11/03/2018 9.1  6.0 - 12.0 fL Final   • Platelets 11/03/2018 277  140 - 500 10*3/mm3 Final   • Neutrophil % 11/03/2018 85.5* 42.7 - 76.0 % Final   • Lymphocyte % 11/03/2018 8.0* 19.6 - 45.3 % Final   • Monocyte % 11/03/2018 5.6  5.0 - 12.0 % Final   • Eosinophil % 11/03/2018 0.7  0.3 - 6.2 % Final   • Basophil % 11/03/2018 0.2  0.0 - 1.5 % Final   • Immature Grans % 11/03/2018 0.4  0.0 - 0.5 % Final   • Neutrophils, Absolute 11/03/2018 11.98* 1.90 - 8.10 10*3/mm3 Final   • Lymphocytes, Absolute 11/03/2018 1.12  0.90 - 4.80 10*3/mm3 Final   • Monocytes, Absolute 11/03/2018 0.79  0.20 - 1.20 10*3/mm3 Final   • Eosinophils, Absolute 11/03/2018 0.10  0.00 - 0.70 10*3/mm3 Final   • Basophils, Absolute 11/03/2018 0.03  0.00 - 0.20 10*3/mm3 Final   • Immature Grans, Absolute 11/03/2018 0.05* 0.00 - 0.03 10*3/mm3 Final       CHEST AND X-RAYS OF THE LEFT HIP AND FEMUR AND THE LEFT HAND     CLINICAL HISTORY: Patient fell. Left hand pain. Left hip and femur pain.  Preop chest x-ray.     Left femur and hip:     An AP view the pelvis and AP and cross table lateral views of the left  hip and femur were obtained. There is an acute subcapital fracture of  the left femoral neck. The distal  femur and the left acetabulum appear  intact. A bipolar right hip arthroplasty is intact.     CHEST:     2 supine views were obtained. The lungs are somewhat poorly inflated but  appear free of focal infiltrates. There are no pleural effusions. The  heart is normal in size.     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 There is also  marked narrowing of the joint space between the trapezium and scaphoid  bone. No acute fractures are identified. An old impacted fracture of the  distal radial metaphysis is suspected.     This report was finalized on 11/3/2018 8:12 PM by Dr. Cole Tian M.D.    Assessment:  Patient Active Problem List   Diagnosis   • Closed displaced fracture of left femoral neck (CMS/HCC)       Plan:      I did have an extensive discussion with the patient along with her family members in the hospital today.  I have reviewed the radiographic findings.  Patient does have a left subcapital femoral neck fracture.  I discussed with the patient along with the family members treatment options.  They do wish to proceed with surgical intervention.  Risks, benefits, and outcomes of the procedure were discussed.  Patient has remained nothing by mouth.  She understands risks.  She wishes to proceed.    Patient understands that the risks of surgery include but are not limited to attack, stroke, DVT, pulmonary embolism, infection, fracture, dislocation, leg length inequality, injury to the blood vessels or nerves, and others.    The patient will be consented for left bipolar hemiarthroplasty by Dr. Yousif Lee.     Date: 11/4/2018  Otoniel Stanley PA-C

## 2018-11-04 NOTE — PLAN OF CARE
Problem: Patient Care Overview  Goal: Plan of Care Review  Outcome: Ongoing (interventions implemented as appropriate)   11/04/18 0038   Coping/Psychosocial   Plan of Care Reviewed With patient   Plan of Care Review   Progress no change   OTHER   Outcome Summary VSS. IV PAIN MEDICATION HELPING WITH PAIN. BEDREST. POSSIBLE OR IN AM.      Goal: Individualization and Mutuality  Outcome: Ongoing (interventions implemented as appropriate)    Goal: Discharge Needs Assessment  Outcome: Ongoing (interventions implemented as appropriate)      Problem: Fall Risk (Adult)  Goal: Identify Related Risk Factors and Signs and Symptoms  Outcome: Outcome(s) achieved Date Met: 11/04/18    Goal: Absence of Fall  Outcome: Ongoing (interventions implemented as appropriate)      Problem: Skin Injury Risk (Adult)  Goal: Identify Related Risk Factors and Signs and Symptoms  Outcome: Outcome(s) achieved Date Met: 11/04/18    Goal: Skin Health and Integrity  Outcome: Ongoing (interventions implemented as appropriate)

## 2018-11-04 NOTE — OP NOTE
HIP ENDOPROSTHESIS  Procedure Note    Tereso Allan  11/3/2018 - 11/4/2018    Pre-op Diagnosis: Left Femoral Neck Fracture  Post-op Diagnosis: Same  Procedure: Left Hip Bipolar Hemiarthroplasty  Surgeon:  Yousif Lee MD  Anesthesia: General, Anesthesiologist: Yamil Roland MD  CRNA: Corry Soto CRNA; Keila Michele CRNA  Staff: Circulator: Ellie Jimenez RN; Kina Lopez RN  Scrub Person: Alicia Morris; Ansley Knight  Assistant: Otoniel Stanley PA-C CFA  Estimated Blood Loss: 200ml  Specimens: * No orders in the log *  Drains: none  Complications: None    Surgical Implants:    Lisa Advocate Femoral Stem Size 12      46 mm Bipolar Shell      +3.5 neck length      Stem Centralizer      Palacos R+G Bone Cement with Gentamicin    Indication for Procedure:   The patient is a 83 y.o. female  who had fallen on their hip resulting in a displaced femoral neck fracture. They presented to the hospital with pain and inability to weight bear.  X-rays confirmed a femoral neck fracture. They were admitted to the medical service and subsequently cleared for surgery. It was felt the patient would benefit form a hemiarthroplasty. Risks, benefits, and alternatives of surgery were discussed with the patient, and informed consent was obtained. Risks include, but are not limited to infection, bleeding, nerve injury, blood clots associated with anesthesia and possibly death.     Procedure:   The patient was seen in Preoperative Holding Area where their surgical site was marked. Preoperative antibiotics were received. H&P and consent updated. They were taken to the Operating Room and provided general anesthesia on the Operating Room table. The patient was then moved into the lateral decubitus position with the operative hip towards the ceiling. Axillary roll placed. All bony prominences well padded. Operative hip prepped and draped in typical sterile fashion. Timeout performed confirming the correct surgical site  and procedure. A standard posterior approach to the hip was performed. Incision taken down through skin and subcutaneous tissues. IT band and fascia were carefully split. Hip was internally rotated. Short external rotators along with a T-capsulotomy were taken down off the hip joint. Fracture was identified. Retractors were placed.    A femoral neck cut was made with the saw followed by completion with an osteotome. The femoral head was then removed. Femoral head was measured and the appropriate size trial shell was placed and noted to be stable.   At this point focus was placed on the femur. Sequential broaching up to the appropriate size noted was performed. Calcar reamer was used to ream the calcar back down to a stable base. A neutral head was placed and reduced and noted to be stable. At this point all instruments were removed.    The canal was prepared for cement. Cement restrictor was placed followed by brushing of the canal and thorough irrigation. It was suctioned dry while the cement was mixed on the back table. The cement was then injected into the canal and packed using my thumb. The stem was then placed with careful attention to anteversion. Excess cement removed. After about 12 minutes the cement had hardened. A trial head was then placed and reduced again. It was noted to be stable. The final bipolar shell was assembled and placed and malleted into position. It was stable on the stem. The hip was then reduced. Leg lengths were noted to be appropriate. The hip was stable with motion. The remainder of 3 liters of normal saline containing Bacitracin were pulsed through the wound. The capsule was repaired with 0 Vicryl suture followed by the IT band and fascia with 0 Vicryl suture. The subcutaneous tissue was closed with 2-0 Vicryl suture followed by a 3-0 Monocryl stitch for the skin. Mepalex dressing was used.  The patient was placed in abduction pillow. They were taken to the PACU postoperatively in  stable condition.    Postoperative Plan:   Protocols for intravenous antibiotics and venous thrombosis were followed for this patient.  IV antibiotics were infused prior to surgery and will be discontinued within 24 hours of completion of the surgical procedure.  Thrombosis prophylaxis will be initiated within 24 hours of the completion of the surgical procedure.  The patient will be weight bearing as tolerated with posterior hip precautions.      Yousif Lee MD     Date: 11/4/2018  Time: 4:04 PM

## 2018-11-04 NOTE — ANESTHESIA POSTPROCEDURE EVALUATION
"Patient: Tereso Allan    Procedure Summary     Date:  11/04/18 Room / Location:  [facility] OR [address] [place] [address] MAIN OR    Anesthesia Start:  1420 Anesthesia Stop:  1616    Procedure:  LEFT HIP BIPOLAR (Left Hip) Diagnosis:      Surgeon:  Yousif Lee MD Provider:  Yamil Roland MD    Anesthesia Type:  general ASA Status:  2          Anesthesia Type: general  Last vitals  BP   128/68 (11/04/18 1614)   Temp   37 °C (98.6 °F) (11/04/18 1614)   Pulse   86 (11/04/18 1614)   Resp   12 (11/04/18 1614)     SpO2   96 % (11/04/18 1614)     Post Anesthesia Care and Evaluation    Patient location during evaluation: bedside  Patient participation: complete - patient participated  Level of consciousness: awake and alert  Pain management: adequate  Airway patency: patent  Anesthetic complications: No anesthetic complications    Cardiovascular status: acceptable  Respiratory status: acceptable  Hydration status: acceptable    Comments: /68 (BP Location: Right arm, Patient Position: Lying)   Pulse 86   Temp 37 °C (98.6 °F) (Oral)   Resp 12   Ht 162.6 cm (64\")   Wt 58.1 kg (128 lb)   SpO2 96%   BMI 21.97 kg/m²           "

## 2018-11-04 NOTE — ANESTHESIA PROCEDURE NOTES
Airway  Urgency: elective    Airway not difficult    General Information and Staff    Patient location during procedure: OR  Anesthesiologist: ELANA FRIEND  CRNA: GEMMA GALLOWAY    Indications and Patient Condition  Indications for airway management: airway protection    Preoxygenated: yes  MILS not maintained throughout  Mask difficulty assessment: 1 - vent by mask    Final Airway Details  Final airway type: endotracheal airway      Successful airway: ETT  Cuffed: yes   Successful intubation technique: direct laryngoscopy  Facilitating devices/methods: cricoid pressure  Endotracheal tube insertion site: oral  Blade: Darrick  Blade size: 3  Cormack-Lehane Classification: grade IIb - view of arytenoids or posterior of glottis only  Placement verified by: chest auscultation and capnometry   Measured from: lips  Number of attempts at approach: 1    Additional Comments  Dentition intact and unchanged. CBEBS.  +ETCO2.

## 2018-11-04 NOTE — ANESTHESIA PREPROCEDURE EVALUATION
Anesthesia Evaluation     Patient summary reviewed and Nursing notes reviewed   no history of anesthetic complications:  NPO Solid Status: > 6 hours  NPO Liquid Status: > 6 hours           Airway   Mallampati: II  TM distance: >3 FB  Neck ROM: full  no difficulty expected and No difficulty expected  Dental - normal exam     Pulmonary - negative pulmonary ROS and normal exam    breath sounds clear to auscultation  (-) rhonchi, decreased breath sounds, wheezes, rales, stridor  Cardiovascular - normal exam    NYHA Classification: I  ECG reviewed  Rhythm: regular  Rate: normal    (+) hyperlipidemia,   (-) murmur, weak pulses, friction rub, systolic click, carotid bruits, JVD, peripheral edema      Neuro/Psych- negative ROS  GI/Hepatic/Renal/Endo - negative ROS     Musculoskeletal     Abdominal  - normal exam    Abdomen: soft.   Substance History - negative use     OB/GYN negative ob/gyn ROS         Other   (+) arthritis                     Anesthesia Plan    ASA 2     general     intravenous induction   Anesthetic plan, all risks, benefits, and alternatives have been provided, discussed and informed consent has been obtained with: patient.

## 2018-11-04 NOTE — PLAN OF CARE
Problem: Patient Care Overview  Goal: Plan of Care Review   11/04/18 8979   Coping/Psychosocial   Plan of Care Reviewed With patient   Plan of Care Review   Progress improving   OTHER   Outcome Summary F/C insertered mid morning dlue to urinary retension. VSS. Pain controlled with Morphine. To OR at 1330 for Left bipolar hip replacement per Dr Ware       Problem: Fall Risk (Adult)  Goal: Absence of Fall  Outcome: Ongoing (interventions implemented as appropriate)      Problem: Skin Injury Risk (Adult)  Goal: Skin Health and Integrity  Outcome: Ongoing (interventions implemented as appropriate)

## 2018-11-04 NOTE — H&P
History and physical    Primary care physician  Dr. Thuy Garza    Chief complaint  Left leg pain  Status post fall    History of present illness  83-year-old white female with history of hyperlipidemia osteoarthritis otherwise in good health presented to [facility] emergency room after the fall with left lower extremity pain.  Patient stated that she slipped and fell and lost her balance.  Patient evaluated in ER found to have left humeral neck fracture Patient denies any loss of consciousness no chest pain shortness of breath dizziness abdominal pain nausea vomiting diarrhea.  Patient fully alert oriented hurting at the fracture site but no other complaint.    PAST MEDICAL HISTORY  • Hyperlipidemia      Osteoarthritis    PAST SURGICAL HISTORY  Surgical History         Past Surgical History:   Procedure Laterality Date   • REPLACEMENT TOTAL HIP ONCOLOGIC Right           FAMILY HISTORY  History reviewed. No pertinent family history.     SOCIAL HISTORY  Social History   Social History            Social History   • Marital status:        Spouse name: N/A   • Number of children: N/A   • Years of education: N/A          Occupational History   • Not on file.           Social History Main Topics   • Smoking status: Never Smoker   • Smokeless tobacco: Never Used   • Alcohol use Not on file   • Drug use: Unknown   • Sexual activity: Not on file           Other Topics Concern   • Not on file          Social History Narrative   • No narrative on file         ALLERGIES  Patient has no known allergies.  Home medications reviewed     REVIEW OF SYSTEMS  Review of Systems   Constitutional: Negative for fever.   HENT: Negative for sore throat.    Eyes: Negative.    Respiratory: Negative for cough and shortness of breath.    Cardiovascular: Negative for chest pain.   Gastrointestinal: Negative for abdominal pain, diarrhea and vomiting.   Genitourinary: Negative for dysuria.   Musculoskeletal: Negative for neck  "pain.        L hip and LLE pain   Skin: Negative for rash.   Allergic/Immunologic: Negative.    Neurological: Negative for weakness, numbness and headaches.   Hematological: Negative.    Psychiatric/Behavioral: Negative.    All other systems reviewed and are negative.     PHYSICAL EXAM  Blood pressure 112/70, pulse 68, temperature 99.3 °F (37.4 °C), temperature source Oral, resp. rate 16, height 162.6 cm (64\"), weight 58.1 kg (128 lb), SpO2 92 %, not currently breastfeeding.    Constitutional: She is oriented to person, place, and time. No distress.   Head: Normocephalic and atraumatic.   Eyes: Pupils are equal, round, and reactive to light. EOM are normal.   Neck: Normal range of motion and full passive range of motion without pain. Neck supple. No spinous process tenderness and no muscular tenderness present.   Cardiovascular: Normal rate, regular rhythm and normal heart sounds.    No murmur heard.  Pulmonary/Chest: Effort normal and breath sounds normal. No respiratory distress.   Abdominal: Soft. Bowel sounds are normal. She exhibits no distension. There is no tenderness. There is no rebound and no guarding.   Musculoskeletal: Normal range of motion. She exhibits no edema.   L spine nontender. LLE is shortened and externally rotated. Tenderness to the lateral L hip and middle third of the L thigh. Pain w/ internal rotation and flexion of the L hip. L Hand there is bruising on the palmar aspect at the base of the thumb, no snuff box tenderness, and no pain w/ axial loading of the L thumb.   Neurological: She is alert and oriented to person, place, and time. She has normal sensation and normal strength.   Skin: Skin is warm and dry. No rash noted.   Psychiatric: Mood and affect normal.     LAB RESULTS  Lab Results (last 24 hours)     Procedure Component Value Units Date/Time    Comprehensive Metabolic Panel [[id number]]  (Abnormal) Collected:  11/03/18 1951    Specimen:  Blood from Arm, Right Updated:  11/03/18 " 2029     Glucose 113 (H) mg/dL      BUN 20 mg/dL      Creatinine 1.00 mg/dL      Sodium 141 mmol/L      Potassium 4.0 mmol/L      Chloride 102 mmol/L      CO2 26.2 mmol/L      Calcium 9.4 mg/dL      Total Protein 7.5 g/dL      Albumin 4.40 g/dL      ALT (SGPT) 26 U/L      AST (SGOT) 25 U/L      Alkaline Phosphatase 65 U/L      Total Bilirubin 0.4 mg/dL      eGFR Non African Amer 53 (L) mL/min/1.73      Globulin 3.1 gm/dL      A/G Ratio 1.4 g/dL      BUN/Creatinine Ratio 20.0     Anion Gap 12.8 mmol/L     Narrative:       The MDRD GFR formula is only valid for adults with stable renal function between ages 18 and 70.    Protime-INR [[id number]]  (Normal) Collected:  11/03/18 1951    Specimen:  Blood from Arm, Right Updated:  11/03/18 2012     Protime 13.2 Seconds      INR 1.02    CBC & Differential [[id number]] Collected:  11/03/18 1951    Specimen:  Blood Updated:  11/03/18 2003    Narrative:       The following orders were created for panel order CBC & Differential.  Procedure                               Abnormality         Status                     ---------                               -----------         ------                     CBC Auto Differential[[id number]]        Abnormal            Final result                 Please view results for these tests on the individual orders.    CBC Auto Differential [[id number]]  (Abnormal) Collected:  11/03/18 1951    Specimen:  Blood from Arm, Right Updated:  11/03/18 2003     WBC 14.02 (H) 10*3/mm3      RBC 4.45 10*6/mm3      Hemoglobin 13.5 g/dL      Hematocrit 40.9 %      MCV 91.9 fL      MCH 30.3 pg      MCHC 33.0 g/dL      RDW 13.6 (H) %      RDW-SD 45.2 fl      MPV 9.1 fL      Platelets 277 10*3/mm3      Neutrophil % 85.5 (H) %      Lymphocyte % 8.0 (L) %      Monocyte % 5.6 %      Eosinophil % 0.7 %      Basophil % 0.2 %      Immature Grans % 0.4 %      Neutrophils, Absolute 11.98 (H) 10*3/mm3      Lymphocytes, Absolute 1.12 10*3/mm3      Monocytes, Absolute 0.79  10*3/mm3      Eosinophils, Absolute 0.10 10*3/mm3      Basophils, Absolute 0.03 10*3/mm3      Immature Grans, Absolute 0.05 (H) 10*3/mm3         Imaging Results (last 24 hours)     Procedure Component Value Units Date/Time    XR Femur 2 View Left [[id number]] Collected:  11/03/18 1955     Updated:  11/03/18 2015    Narrative:       CHEST AND X-RAYS OF THE LEFT HIP AND FEMUR AND THE LEFT HAND     CLINICAL HISTORY: Patient fell. Left hand pain. Left hip and femur pain.  Preop chest x-ray.     Left femur and hip:     An AP view the pelvis and AP and cross table lateral views of the left  hip and femur were obtained. There is an acute subcapital fracture of  the left femoral neck. The distal femur and the left acetabulum appear  intact. A bipolar right hip arthroplasty is intact.     CHEST:     2 supine views were obtained. The lungs are somewhat poorly inflated but  appear free of focal infiltrates. There are no pleural effusions. The  heart is normal in size.     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 There is also  marked narrowing of the joint space between the trapezium and scaphoid  bone. No acute fractures are identified. An old impacted fracture of the  distal radial metaphysis is suspected.     This report was finalized on 11/3/2018 8:12 PM by Dr. Cole Tian M.D.       XR Hip With or Without Pelvis 2 - 3 View Left [[id number]] Collected:  11/03/18 1955     Updated:  11/03/18 2015    Narrative:       CHEST AND X-RAYS OF THE LEFT HIP AND FEMUR AND THE LEFT HAND     CLINICAL HISTORY: Patient fell. Left hand pain. Left hip and femur pain.  Preop chest x-ray.     Left femur and hip:     An AP view the pelvis and AP and cross table lateral views of the left  hip and femur were obtained. There is an acute subcapital fracture of  the left femoral neck. The distal femur and the left acetabulum appear  intact. A  bipolar right hip arthroplasty is intact.     CHEST:     2 supine views were obtained. The lungs are somewhat poorly inflated but  appear free of focal infiltrates. There are no pleural effusions. The  heart is normal in size.     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 There is also  marked narrowing of the joint space between the trapezium and scaphoid  bone. No acute fractures are identified. An old impacted fracture of the  distal radial metaphysis is suspected.     This report was finalized on 11/3/2018 8:12 PM by Dr. Cole Tian M.D.       XR Chest 1 View [[id number]] Collected:  11/03/18 1955     Updated:  11/03/18 2015    Narrative:       CHEST AND X-RAYS OF THE LEFT HIP AND FEMUR AND THE LEFT HAND     CLINICAL HISTORY: Patient fell. Left hand pain. Left hip and femur pain.  Preop chest x-ray.     Left femur and hip:     An AP view the pelvis and AP and cross table lateral views of the left  hip and femur were obtained. There is an acute subcapital fracture of  the left femoral neck. The distal femur and the left acetabulum appear  intact. A bipolar right hip arthroplasty is intact.     CHEST:     2 supine views were obtained. The lungs are somewhat poorly inflated but  appear free of focal infiltrates. There are no pleural effusions. The  heart is normal in size.     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 There is also  marked narrowing of the joint space between the trapezium and scaphoid  bone. No acute fractures are identified. An old impacted fracture of the  distal radial metaphysis is suspected.     This report was finalized on 11/3/2018 8:12 PM by Dr. Cole Tian M.D.       XR Hand 3+ View Left [[id number]] Collected:  11/03/18 1955     Updated:  11/03/18 2015    Narrative:        CHEST AND X-RAYS OF THE LEFT HIP AND FEMUR AND THE LEFT HAND     CLINICAL HISTORY: Patient fell. Left hand pain. Left hip and femur pain.  Preop chest x-ray.     Left femur and hip:     An AP view the pelvis and AP and cross table lateral views of the left  hip and femur were obtained. There is an acute subcapital fracture of  the left femoral neck. The distal femur and the left acetabulum appear  intact. A bipolar right hip arthroplasty is intact.     CHEST:     2 supine views were obtained. The lungs are somewhat poorly inflated but  appear free of focal infiltrates. There are no pleural effusions. The  heart is normal in size.     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 There is also  marked narrowing of the joint space between the trapezium and scaphoid  bone. No acute fractures are identified. An old impacted fracture of the  distal radial metaphysis is suspected.     This report was finalized on 11/3/2018 8:12 PM by Dr. Cole Tian M.D.           EKG                              Rhythm/Rate: NSR 73  P waves and AK: nml  QRS, axis: RBBB   ST and T waves: RBBB       Current Facility-Administered Medications:   •  aspirin chewable tablet 81 mg, 81 mg, Oral, Daily, Thai Serna MD  •  atorvastatin (LIPITOR) tablet 10 mg, 10 mg, Oral, Daily, Thai Serna MD  •  cetirizine (zyrTEC) tablet 10 mg, 10 mg, Oral, Daily, Thai Serna MD  •  morphine injection 1 mg, 1 mg, Intravenous, Q4H PRN **OR** morphine injection 2 mg, 2 mg, Intravenous, Q4H PRN, Thai Serna MD, 2 mg at 11/04/18 0937  •  ondansetron (ZOFRAN) injection 4 mg, 4 mg, Intravenous, Q6H PRN, Thai Serna MD  •  pantoprazole (PROTONIX) EC tablet 40 mg, 40 mg, Oral, Q AM, Thai Serna MD  •  [COMPLETED] Insert peripheral IV, , , Once **AND** sodium chloride 0.9 % flush 10 mL, 10 mL, Intravenous, PRN, Sunil Elizabeth MD  •  sodium chloride 0.9  % with KCl 20 mEq/L infusion, 75 mL/hr, Intravenous, Continuous, Maty Serna MD     ASSESSMENT  Acute left femoral neck fracture  Status post fall  Hyperlipidemia  Osteoarthritis  Right bundle branch block    PLAN  Admit  Bedrest  Pain management  Orthopedic surgery consult  Continue home medications and adjust the doses  Okay for surgery with age-related risk factor  Discussed with family including granddaughter who is a nurse  Stress ulcer DVT prophylaxis  Supportive care  Follow closely further recommendation according to hospital course    MATY SERNA MD

## 2018-11-05 LAB
ALBUMIN SERPL-MCNC: 3.5 G/DL (ref 3.5–5.2)
ALBUMIN/GLOB SERPL: 1.2 G/DL
ALP SERPL-CCNC: 57 U/L (ref 39–117)
ALT SERPL W P-5'-P-CCNC: 17 U/L (ref 1–33)
ANION GAP SERPL CALCULATED.3IONS-SCNC: 11.3 MMOL/L
AST SERPL-CCNC: 19 U/L (ref 1–32)
BASOPHILS # BLD AUTO: 0.01 10*3/MM3 (ref 0–0.2)
BASOPHILS NFR BLD AUTO: 0.1 % (ref 0–1.5)
BILIRUB SERPL-MCNC: 0.4 MG/DL (ref 0.1–1.2)
BUN BLD-MCNC: 15 MG/DL (ref 8–23)
BUN/CREAT SERPL: 16.1 (ref 7–25)
CALCIUM SPEC-SCNC: 8.4 MG/DL (ref 8.6–10.5)
CHLORIDE SERPL-SCNC: 103 MMOL/L (ref 98–107)
CHOLEST SERPL-MCNC: 142 MG/DL (ref 0–200)
CO2 SERPL-SCNC: 24.7 MMOL/L (ref 22–29)
CREAT BLD-MCNC: 0.93 MG/DL (ref 0.57–1)
DEPRECATED RDW RBC AUTO: 46.9 FL (ref 37–54)
EOSINOPHIL # BLD AUTO: 0 10*3/MM3 (ref 0–0.7)
EOSINOPHIL NFR BLD AUTO: 0 % (ref 0.3–6.2)
ERYTHROCYTE [DISTWIDTH] IN BLOOD BY AUTOMATED COUNT: 13.9 % (ref 11.7–13)
GFR SERPL CREATININE-BSD FRML MDRD: 58 ML/MIN/1.73
GLOBULIN UR ELPH-MCNC: 3 GM/DL
GLUCOSE BLD-MCNC: 148 MG/DL (ref 65–99)
HBA1C MFR BLD: 5.5 % (ref 4.8–5.6)
HCT VFR BLD AUTO: 35.9 % (ref 35.6–45.5)
HDLC SERPL-MCNC: 51 MG/DL (ref 40–60)
HGB BLD-MCNC: 11.7 G/DL (ref 11.9–15.5)
IMM GRANULOCYTES # BLD: 0.01 10*3/MM3 (ref 0–0.03)
IMM GRANULOCYTES NFR BLD: 0.1 % (ref 0–0.5)
LDLC SERPL CALC-MCNC: 77 MG/DL (ref 0–100)
LDLC/HDLC SERPL: 1.5 {RATIO}
LYMPHOCYTES # BLD AUTO: 0.94 10*3/MM3 (ref 0.9–4.8)
LYMPHOCYTES NFR BLD AUTO: 9.6 % (ref 19.6–45.3)
MCH RBC QN AUTO: 29.9 PG (ref 26.9–32)
MCHC RBC AUTO-ENTMCNC: 32.6 G/DL (ref 32.4–36.3)
MCV RBC AUTO: 91.8 FL (ref 80.5–98.2)
MONOCYTES # BLD AUTO: 1.14 10*3/MM3 (ref 0.2–1.2)
MONOCYTES NFR BLD AUTO: 11.6 % (ref 5–12)
NEUTROPHILS # BLD AUTO: 7.75 10*3/MM3 (ref 1.9–8.1)
NEUTROPHILS NFR BLD AUTO: 78.7 % (ref 42.7–76)
NT-PROBNP SERPL-MCNC: 161.4 PG/ML (ref 0–1800)
PLATELET # BLD AUTO: 236 10*3/MM3 (ref 140–500)
PMV BLD AUTO: 9.1 FL (ref 6–12)
POTASSIUM BLD-SCNC: 4.6 MMOL/L (ref 3.5–5.2)
PROT SERPL-MCNC: 6.5 G/DL (ref 6–8.5)
RBC # BLD AUTO: 3.91 10*6/MM3 (ref 3.9–5.2)
SODIUM BLD-SCNC: 139 MMOL/L (ref 136–145)
TRIGL SERPL-MCNC: 72 MG/DL (ref 0–150)
TSH SERPL DL<=0.05 MIU/L-ACNC: 0.91 MIU/ML (ref 0.27–4.2)
VLDLC SERPL-MCNC: 14.4 MG/DL (ref 5–40)
WBC NRBC COR # BLD: 9.84 10*3/MM3 (ref 4.5–10.7)

## 2018-11-05 PROCEDURE — 80053 COMPREHEN METABOLIC PANEL: CPT | Performed by: HOSPITALIST

## 2018-11-05 PROCEDURE — 83036 HEMOGLOBIN GLYCOSYLATED A1C: CPT | Performed by: HOSPITALIST

## 2018-11-05 PROCEDURE — 84443 ASSAY THYROID STIM HORMONE: CPT | Performed by: HOSPITALIST

## 2018-11-05 PROCEDURE — 85025 COMPLETE CBC W/AUTO DIFF WBC: CPT | Performed by: HOSPITALIST

## 2018-11-05 PROCEDURE — 97110 THERAPEUTIC EXERCISES: CPT

## 2018-11-05 PROCEDURE — 80061 LIPID PANEL: CPT | Performed by: HOSPITALIST

## 2018-11-05 PROCEDURE — 83880 ASSAY OF NATRIURETIC PEPTIDE: CPT | Performed by: HOSPITALIST

## 2018-11-05 PROCEDURE — 97161 PT EVAL LOW COMPLEX 20 MIN: CPT

## 2018-11-05 PROCEDURE — 25010000003 CEFAZOLIN IN DEXTROSE 2-4 GM/100ML-% SOLUTION: Performed by: ORTHOPAEDIC SURGERY

## 2018-11-05 RX ADMIN — CETIRIZINE HYDROCHLORIDE 10 MG: 10 TABLET, FILM COATED ORAL at 20:33

## 2018-11-05 RX ADMIN — PANTOPRAZOLE SODIUM 40 MG: 40 TABLET, DELAYED RELEASE ORAL at 06:39

## 2018-11-05 RX ADMIN — CEFAZOLIN SODIUM 2 G: 2 INJECTION, SOLUTION INTRAVENOUS at 06:39

## 2018-11-05 RX ADMIN — APIXABAN 2.5 MG: 2.5 TABLET, FILM COATED ORAL at 10:21

## 2018-11-05 RX ADMIN — HYDROCODONE BITARTRATE AND ACETAMINOPHEN 1 TABLET: 5; 325 TABLET ORAL at 09:48

## 2018-11-05 RX ADMIN — DOCUSATE SODIUM -SENNOSIDES 2 TABLET: 50; 8.6 TABLET, COATED ORAL at 20:33

## 2018-11-05 RX ADMIN — HYDROCODONE BITARTRATE AND ACETAMINOPHEN 1 TABLET: 5; 325 TABLET ORAL at 14:49

## 2018-11-05 RX ADMIN — HYDROCODONE BITARTRATE AND ACETAMINOPHEN 1 TABLET: 5; 325 TABLET ORAL at 18:53

## 2018-11-05 RX ADMIN — APIXABAN 2.5 MG: 2.5 TABLET, FILM COATED ORAL at 20:33

## 2018-11-05 RX ADMIN — ATORVASTATIN CALCIUM 10 MG: 10 TABLET, FILM COATED ORAL at 20:33

## 2018-11-05 RX ADMIN — ASPIRIN 81 MG: 81 TABLET, CHEWABLE ORAL at 20:33

## 2018-11-05 RX ADMIN — HYDROCODONE BITARTRATE AND ACETAMINOPHEN 1 TABLET: 5; 325 TABLET ORAL at 22:42

## 2018-11-05 NOTE — THERAPY EVALUATION
"Acute Care - Physical Therapy Initial Evaluation  [facility]     Patient Name: Tereso Allan  : 1935  MRN: [id number]  Today's Date: 2018   Onset of Illness/Injury or Date of Surgery: 18  Date of Referral to PT: 18  Referring Physician: Yousif Sifuentes      Admit Date: 11/3/2018    Visit Dx:     ICD-10-CM ICD-9-CM   1. Closed displaced fracture of left femoral neck (CMS/HCC) S72.002A 820.8   2. Fall, initial encounter W19.XXXA E888.9   3. Difficulty walking R26.2 719.7     Patient Active Problem List   Diagnosis   • Closed displaced fracture of left femoral neck (CMS/HCC)     Past Medical History:   Diagnosis Date   • Elevated cholesterol    • Hyperlipidemia      Past Surgical History:   Procedure Laterality Date   • APPENDECTOMY     • COLONOSCOPY     • EYE SURGERY     • FRACTURE SURGERY     • HIP ENDOPROSTHESIS Left 2018    Procedure: LEFT HIP BIPOLAR;  Surgeon: Yousif Lee MD;  Location: [facility] OR;  Service: Orthopedics   • JOINT REPLACEMENT     • REPLACEMENT TOTAL HIP ONCOLOGIC Right         PT ASSESSMENT (last 12 hours)      Physical Therapy Evaluation     Row Name 18 0920          PT Evaluation Time/Intention    Subjective Information complains of;weakness;fatigue;pain  -MS     Document Type evaluation  -MS     Mode of Treatment physical therapy;individual therapy  -MS     Patient Effort good  -MS     Comment Pt. reports \"soreness\" and \"aches\" in her Left hip/groin area this AM as well as overall fatigue with upright mobility.  -MS     Row Name 18 0920          General Information    Onset of Illness/Injury or Date of Surgery 18  -MS     Referring Physician Yousif Sifuentes  -MS     Patient Observations agree to therapy;cooperative  -MS     Prior Level of Function independent:  -MS     Equipment Currently Used at Home none  -MS     Pertinent History of Current Functional Problem Pt. is s/p Left Bipolar Hip (18)  -MS     Existing " Precautions/Restrictions fall;hip, posterior   Left L.E.  -MS     Equipment Ordered for Patient --   Pt. will need Rwx and BSC if discharged home  -MS     Risks Reviewed patient and family:  -MS     Benefits Reviewed patient and family:  -MS     Barriers to Rehab none identified  -MS     Row Name 11/05/18 0920          Cognitive Assessment/Intervention- PT/OT    Orientation Status (Cognition) oriented x 3  -MS     Follows Commands (Cognition) WNL  -MS     Personal Safety Interventions fall prevention program maintained;gait belt;nonskid shoes/slippers when out of bed;supervised activity  -MS     Row Name 11/05/18 0920          Mobility Assessment/Treatment    Extremity Weight-bearing Status left lower extremity  -MS     Left Lower Extremity (Weight-bearing Status) weight-bearing as tolerated (WBAT)  -MS     Row Name 11/05/18 0920          Bed Mobility Assessment/Treatment    Comment (Bed Mobility) Pt. up in chair this AM.  -MS     Row Name 11/05/18 0920          Transfer Assessment/Treatment    Transfer Assessment/Treatment sit-stand transfer;stand-sit transfer  -MS     Sit-Stand [place] (Transfers) minimum assist (75% patient effort)  -MS     Stand-Sit [place] (Transfers) minimum assist (75% patient effort)  -MS     Row Name 11/05/18 0920          Sit-Stand Transfer    Assistive Device (Sit-Stand Transfers) walker, front-wheeled  -MS     Row Name 11/05/18 0920          Stand-Sit Transfer    Assistive Device (Stand-Sit Transfers) walker, front-wheeled  -MS     Row Name 11/05/18 0920          Gait/Stairs Assessment/Training    [place] Level (Gait) contact guard  -MS     Assistive Device (Gait) walker, front-wheeled  -MS     Distance in Feet (Gait) 60 feet  -MS     Pattern (Gait) step-to  -MS     Deviations/Abnormal Patterns (Gait) antalgic;kuldip decreased;stride length decreased  -MS     Bilateral Gait Deviations forward flexed posture  -MS     Comment (Gait/Stairs) Pt. requires verbal/tactile cues  for posture correction and for proper gait sequencing with use of the Rwx.  -MS     Row Name 11/05/18 0920          General ROM    GENERAL ROM COMMENTS BUE/RLE (WFL's)  -MS     Row Name 11/05/18 0920          MMT (Manual Muscle Testing)    General MMT Comments BUE/RLE (3+/5)  -MS     Row Name 11/05/18 0920          Therapeutic Exercise    Comment (Therapeutic Exercise) Left THR protocol x 10 reps completed with assist.  -MS     Row Name 11/05/18 0920          Pain Assessment    Additional Documentation Pain Scale: Numbers Pre/Post-Treatment (Group)  -MS     Row Name 11/05/18 0920          Pain Scale: Numbers Pre/Post-Treatment    Pain Scale: Numbers, Pretreatment 4/10  -MS     Pain Scale: Numbers, Post-Treatment 4/10  -MS     Pain Location - Side Left  -MS     Pain Location hip  -MS     Pain Intervention(s) Medication (See MAR);Cold applied;Repositioned;Elevated;Rest  -MS     Row Name             Wound 11/04/18 1551 Left hip incision    Wound - Properties Group Date first assessed: 11/04/18  -KK Time first assessed: 1551  -KK Side: Left  -KK Location: hip  -KK Type: incision  -KK    Row Name 11/05/18 0920          Physical Therapy Clinical Impression    Date of Referral to PT 11/04/18  -MS     Criteria for Skilled Interventions Met (PT Clinical Impression) treatment indicated  -MS     Rehab Potential (PT Clinical Summary) good, to achieve stated therapy goals  -MS     Row Name 11/05/18 0920          Physical Therapy Goals    Bed Mobility Goal Selection (PT) bed mobility, PT goal 1  -MS     Transfer Goal Selection (PT) transfer, PT goal 1  -MS     Gait Training Goal Selection (PT) gait training, PT goal 1  -MS     Row Name 11/05/18 0920          Bed Mobility Goal 1 (PT)    Activity/Assistive Device (Bed Mobility Goal 1, PT) bed mobility activities, all  -MS     [place] Level/Cues Needed (Bed Mobility Goal 1, PT) standby assist  -MS     Time Frame (Bed Mobility Goal 1, PT) long term goal (LTG);3 days  -MS     [place]  Name 11/05/18 0920          Transfer Goal 1 (PT)    Activity/Assistive Device (Transfer Goal 1, PT) transfers, all;walker, rolling  -MS     [place] Level/Cues Needed (Transfer Goal 1, PT) standby assist  -MS     Time Frame (Transfer Goal 1, PT) long term goal (LTG);3 days  -MS     Row Name 11/05/18 0920          Gait Training Goal 1 (PT)    Activity/Assistive Device (Gait Training Goal 1, PT) gait (walking locomotion);walker, rolling  -MS     [place] Level (Gait Training Goal 1, PT) standby assist  -MS     Distance (Gait Goal 1, PT) 120 feet  -MS     Time Frame (Gait Training Goal 1, PT) long term goal (LTG);3 days  -MS     Row Name 11/05/18 0920          Positioning and Restraints    Pre-Treatment Position sitting in chair/recliner  -MS     Post Treatment Position chair  -MS     In Chair notified nsg;reclined;sitting;call light within reach;encouraged to call for assist;exit alarm on;with family/caregiver;pillow between legs   All lines intact. Ice pack to Left hip.  -MS       User Key  (r) = Recorded By, (t) = Taken By, (c) = Cosigned By    Initials Name Provider Type    Pablo Arora, PT Physical Therapist    Kina Harding RN Registered Nurse          Physical Therapy Education     Title: PT OT SLP Therapies (Done)     Topic: Physical Therapy (Done)     Point: Mobility training (Done)    Learning Progress Summary     Learner Status Readiness Method Response Comment Documented by    Patient Done Acceptance ANNABELLE CUNNINGHAM NR  MS 11/05/18 0925          Point: Home exercise program (Done)    Learning Progress Summary     Learner Status Readiness Method Response Comment Documented by    Patient Done Acceptance ANNABELLE CUNNINGHAM NR  MS 11/05/18 0925          Point: Body mechanics (Done)    Learning Progress Summary     Learner Status Readiness Method Response Comment Documented by    Patient Done Acceptance ANNABELLE CUNNINGHAM NR  MS 11/05/18 0925          Point: Precautions (Done)    Learning Progress Summary     Learner Status  Readiness Method Response Comment Documented by    Patient Done Acceptance ANNABELLE CUNNINGHAM NR  MS 11/05/18 0925                      User Key     Initials Effective Dates Name Provider Type Discipline    MS 04/03/18 -  Pabol Kaur, PT Physical Therapist PT                PT Recommendation and Plan  Anticipated Discharge Disposition (PT): home with assist, home with home health, skilled nursing facility (Pending pt. progress)  Planned Therapy Interventions (PT Eval): balance training, bed mobility training, gait training, home exercise program, patient/family education, postural re-education, ROM (range of motion), strengthening, transfer training  Therapy Frequency (PT Clinical Impression): 2 times/day  Outcome Summary/Treatment Plan (PT)  Anticipated Discharge Disposition (PT): home with assist, home with home health, skilled nursing facility (Pending pt. progress)  Plan of Care Reviewed With: patient, family  Outcome Summary: Pt. will benefit from skilled inpt. P.T. to address her functional deficits and to assist pt. in regaining her maximum level of independence with functional mobility.          Outcome Measures     Row Name 11/05/18 0900             How much help from another person do you currently need...    Turning from your back to your side while in flat bed without using bedrails? 3  -MS      Moving from lying on back to sitting on the side of a flat bed without bedrails? 3  -MS      Moving to and from a bed to a chair (including a wheelchair)? 3  -MS      Standing up from a chair using your arms (e.g., wheelchair, bedside chair)? 2  -MS      Climbing 3-5 steps with a railing? 2  -MS      To walk in hospital room? 3  -MS      AM-PAC 6 Clicks Score 16  -MS         Functional Assessment    Outcome Measure Options AM-PAC 6 Clicks Basic Mobility (PT)  -MS        User Key  (r) = Recorded By, (t) = Taken By, (c) = Cosigned By    Initials Name Provider Type    MS Pablo Kaur, PT Physical Therapist            Time Calculation:         PT Charges     Row Name 11/05/18 0926             Time Calculation    Start Time 0825  -MS      Stop Time 0845  -MS      Time Calculation (min) 20 min  -MS      PT Received On 11/05/18  -MS      PT - Next Appointment 11/05/18  -MS      PT Goal Re-Cert Due Date 11/08/18  -MS         Time Calculation- PT    Total Timed Code Minutes- PT 15 minute(s)  -MS        User Key  (r) = Recorded By, (t) = Taken By, (c) = Cosigned By    Initials Name Provider Type    Pablo Arora, PT Physical Therapist        Therapy Suggested Charges     Code   Minutes Charges    None           Therapy Charges for Today     Code Description Service Date Service Provider Modifiers Qty    13056351944 HC PT EVAL LOW COMPLEXITY 1 11/5/2018 Pablo Kaur, PT GP 1    62380411035 HC PT THER PROC EA 15 MIN 11/5/2018 Pablo Kaur, PT GP 1          PT G-Codes  Outcome Measure Options: AM-PAC 6 Clicks Basic Mobility (PT)  AM-PAC 6 Clicks Score: 16      Pablo Kaur PT  11/5/2018

## 2018-11-05 NOTE — PROGRESS NOTES
Orthopedic Progress Note    Subjective :   Doing well. Pain controlled.    Objective :    Vital signs in last 24 hours:  Temp:  [97.1 °F (36.2 °C)-99.3 °F (37.4 °C)] 99.3 °F (37.4 °C)  Heart Rate:  [68-98] 79  Resp:  [10-16] 16  BP: ()/(57-91) 115/62  Vitals:    11/04/18 1922 11/04/18 2308 11/05/18 0300 11/05/18 0700   BP: 120/71 95/57 118/71 115/62   BP Location: Left arm Right arm Right arm Right arm   Patient Position: Lying Lying Lying Sitting   Pulse: 71 78 80 79   Resp: 16 16 16 16   Temp: 98 °F (36.7 °C) 97.9 °F (36.6 °C) 97.1 °F (36.2 °C) 99.3 °F (37.4 °C)   TempSrc: Oral Oral Oral Oral   SpO2: 94% 96% 97% 92%   Weight:       Height:           PHYSICAL EXAM:  Patient is calm, in no acute distress, awake and oriented x 3.  Dressing clean, dry and intact.  No signs of infection.  Swelling is appropriate.  Ecchymosis is appropriate in amount.  Homans test is negative.  Patient is neurovascularly intact distally.      LABS:    Results from last 7 days  Lab Units 11/05/18  0339   WBC 10*3/mm3 9.84   HEMOGLOBIN g/dL 11.7*   HEMATOCRIT % 35.9   PLATELETS 10*3/mm3 236       Results from last 7 days  Lab Units 11/05/18  0339   SODIUM mmol/L 139   POTASSIUM mmol/L 4.6   CHLORIDE mmol/L 103   CO2 mmol/L 24.7   BUN mg/dL 15   CREATININE mg/dL 0.93   GLUCOSE mg/dL 148*   CALCIUM mg/dL 8.4*       Results from last 7 days  Lab Units 11/03/18  1951   INR  1.02       ASSESSMENT:  Status post left hip bipolar, POD#1    Plan:  Continue Physical Therapy, WBAT.  Continue SCDs, Continue eliquis x 2 weeks for DVT prophylaxis.  Dispo planning for rehab when medically stable.    Yousif Lee MD    Date: 11/5/2018  Time: 8:14 AM

## 2018-11-05 NOTE — PLAN OF CARE
Problem: Patient Care Overview  Goal: Plan of Care Review   11/05/18 0926   Coping/Psychosocial   Plan of Care Reviewed With patient;family   OTHER   Outcome Summary Pt. will benefit from skilled inpt. P.T. to address her functional deficits and to assist pt. in regaining her maximum level of independence with functional mobility.

## 2018-11-05 NOTE — PROGRESS NOTES
Discharge Planning Assessment  [facility]     Patient Name: Tereso Allan  MRN: [id number]  Today's Date: 11/5/2018    Admit Date: 11/3/2018          Discharge Needs Assessment     Row Name 11/05/18 1011       Living Environment    Lives With alone    Current Living Arrangements independent/assisted living facility    Family Caregiver if Needed child(mu), adult    Quality of Family Relationships helpful;involved;supportive       Transition Planning    Patient/Family Anticipates Transition to inpatient rehabilitation facility    Patient/Family Anticipated Services at Transition skilled nursing       Discharge Needs Assessment    Readmission Within the Last 30 Days no previous admission in last 30 days    Equipment Currently Used at Home none    Discharge Facility/Level of Care Needs nursing facility, skilled    Offered/Gave Vendor List yes            Discharge Plan     Row Name 11/05/18 1012       Plan    Plan [facility]     Patient/Family in Agreement with Plan yes    Plan Comments Met w/ pt and family at the bedside, verified facesheet and discussed d/c plan. Pt [facility] independently at [facility]. She would like to d/c to [facility] at [facility]. Terese w/ [facility] notified to hiwot.         Destination     Service Request Status Selected Specialties Address Phone Number Fax Number    [facility] Pending - Request Sent N/A [address] [facility] [phone]        Uzma Bobo RN 11/5/2018 1011    .11/5/2018Terese notified.                  Durable Medical Equipment     No service coordination in this encounter.      Dialysis/Infusion     No service coordination in this encounter.      Home Medical Care     No service coordination in this encounter.      Social Care     No service coordination in this encounter.                Demographic Summary    No documentation.           Functional Status     Row Name 11/05/18 1012       Functional  Status    Usual Activity Tolerance good    Current Activity Tolerance fair       Functional Status, IADL    Medications independent    Meal Preparation independent    Housekeeping independent    Laundry independent    Shopping independent       Mental Status    General Appearance WDL WDL       Mental Status Summary    Recent Changes in Mental Status/Cognitive Functioning no changes            Psychosocial    No documentation.           Abuse/Neglect    No documentation.           Legal    No documentation.           Substance Abuse    No documentation.           Patient Forms     Row Name 11/05/18 1012       Patient Forms    Provider Choice List Delivered    Delivered to Patient    Method of delivery In person          Uzma Bobo RN

## 2018-11-05 NOTE — DISCHARGE PLACEMENT REQUEST
"Rafal Castro  (83 y.o. Female)     Date of Birth Social Security Number Address Home Phone MRN    1935  [id number]    [place] [place] [address] [phone] [id number]    Episcopalian Marital Status          Mormon        Admission Date Admission Type Admitting Provider Attending Provider Department, Room/Bed    11/3/18 Emergency Thai Serna MD Ahmed, Aftab, MD [address], P891/1    Discharge Date Discharge Disposition Discharge Destination                       Attending Provider:  Thai Serna MD    Allergies:  Zoster Vaccine Live, Levofloxacin    Isolation:  None   Infection:  None   Code Status:  CPR    Ht:  162.6 cm (64\")   Wt:  58.1 kg (128 lb)    Admission Cmt:  None   Principal Problem:  None                Active Insurance as of 11/3/2018     Primary Coverage     Payor Plan Insurance Group Employer/Plan Group    MEDICARE MEDICARE A & B      Payor Plan Address Payor Plan Phone Number Effective From Effective To    [address] [phone] 6/1/2002     [facility] [address]       Subscriber Name Subscriber Birth Date Member ID       RAFAL CASTRO 1935 [id number]           Secondary Coverage     Payor Plan Insurance Group Employer/Plan Group    AARP MED SUPP AARP HEALTH CARE OPTIONS PLAN F     Payor Plan Address Payor Plan Phone Number Effective From Effective To    [facility] [phone] 1/1/2018     [address]       [facility] [address]       Subscriber Name Subscriber Birth Date Member ID       RAFAL CASTRO 1935 [id number]                 Emergency Contacts      (Rel.) Home Phone Work Phone Mobile Phone    Jerson Arora (Other) [phone] -- [phone]              "

## 2018-11-05 NOTE — PLAN OF CARE
Problem: Patient Care Overview  Goal: Plan of Care Review  Outcome: Ongoing (interventions implemented as appropriate)   11/05/18 5200   Coping/Psychosocial   Plan of Care Reviewed With patient   Plan of Care Review   Progress improving   OTHER   Outcome Summary Pain well controlled with PO pain medication. Ambulates with assist x1 and walker. DC to rehab when medically ready. VSS. Voiding without difficulty.      Goal: Individualization and Mutuality  Outcome: Ongoing (interventions implemented as appropriate)    Goal: Discharge Needs Assessment  Outcome: Ongoing (interventions implemented as appropriate)    Goal: Interprofessional Rounds/Family Conf  Outcome: Ongoing (interventions implemented as appropriate)      Problem: Fall Risk (Adult)  Goal: Absence of Fall  Outcome: Ongoing (interventions implemented as appropriate)      Problem: Skin Injury Risk (Adult)  Goal: Skin Health and Integrity  Outcome: Ongoing (interventions implemented as appropriate)      Problem: Fracture Orthopaedic (Adult)  Goal: Signs and Symptoms of Listed Potential Problems Will be Absent, Minimized or Managed (Fracture Orthopaedic)  Outcome: Ongoing (interventions implemented as appropriate)

## 2018-11-05 NOTE — THERAPY TREATMENT NOTE
"Acute Care - Physical Therapy Treatment Note  [facility]     Patient Name: Tereso Allan  : 1935  MRN: [id number]  Today's Date: 2018  Onset of Illness/Injury or Date of Surgery: 18  Date of Referral to PT: 18  Referring Physician: Yousif Sifuentes    Admit Date: 11/3/2018    Visit Dx:    ICD-10-CM ICD-9-CM   1. Closed displaced fracture of left femoral neck (CMS/HCC) S72.002A 820.8   2. Fall, initial encounter W19.XXXA E888.9   3. Difficulty walking R26.2 719.7     Patient Active Problem List   Diagnosis   • Closed displaced fracture of left femoral neck (CMS/HCC)       Therapy Treatment          Rehabilitation Treatment Summary     Row Name 18 1456             Treatment Time/Intention    Discipline physical therapist  -MS      Document Type therapy note (daily note)  -MS      Subjective Information complains of;fatigue;pain  -MS      Mode of Treatment physical therapy;individual therapy  -MS      Patient Effort good  -MS      Comment Pt. reports an increase in \"soreness\" this PM but otherwise feeling \"okay\" and agreeable to work with P.T.  -MS      Existing Precautions/Restrictions fall;hip, posterior   Left L.E.  -MS      Recorded by [MS] Pablo Kaur, PT 18      Row Name 18 145             Cognitive Assessment/Intervention- PT/OT    Orientation Status (Cognition) oriented x 3  -MS      Follows Commands (Cognition) WNL  -MS      Personal Safety Interventions gait belt;fall prevention program maintained;nonskid shoes/slippers when out of bed;supervised activity  -MS      Recorded by [MS] Pablo Kaur, PT 18 1458      Row Name 18 1456             Mobility Assessment/Intervention    Left Lower Extremity (Weight-bearing Status) weight-bearing as tolerated (WBAT)  -MS      Recorded by [MS] Pablo Kaur, PT 18      Row Name 18 145             Bed Mobility Assessment/Treatment    Comment (Bed Mobility) Pt. up in chair " this PM.  -MS      Recorded by [MS] Pablo Kaur, PT 11/05/18 1458      Row Name 11/05/18 1456             Sit-Stand Transfer    Sit-Stand [place] (Transfers) contact guard  -MS      Assistive Device (Sit-Stand Transfers) walker, front-wheeled  -MS      Recorded by [MS] Pablo Kaur, PT 11/05/18 1458      Row Name 11/05/18 1456             Stand-Sit Transfer    Stand-Sit [place] (Transfers) contact guard  -MS      Assistive Device (Stand-Sit Transfers) walker, front-wheeled  -MS      Recorded by [MS] Pablo Kaur, PT 11/05/18 1458      Row Name 11/05/18 1456             Gait/Stairs Assessment/Training    [place] Level (Gait) contact guard  -MS      Assistive Device (Gait) walker, front-wheeled  -MS      Distance in Feet (Gait) 75 feet  -MS      Pattern (Gait) step-through  -MS      Deviations/Abnormal Patterns (Gait) antalgic;kuldip decreased  -MS      Recorded by [MS] Pablo Kaur, PT 11/05/18 1458      Row Name 11/05/18 1456             Therapeutic Exercise    Comment (Therapeutic Exercise) Left THR protocol x 15 reps completed with assist.  -MS      Recorded by [MS] Pablo Kaur, PT 11/05/18 1458      Row Name 11/05/18 1456             Positioning and Restraints    Pre-Treatment Position sitting in chair/recliner  -MS      Post Treatment Position chair  -MS      In Chair notified nsg;reclined;sitting;call light within reach;encouraged to call for assist;with family/caregiver   Ice pack to Left knee.  -MS      Recorded by [MS] Pablo Kaur, PT 11/05/18 1458      Row Name 11/05/18 1456             Pain Scale: Numbers Pre/Post-Treatment    Pain Scale: Numbers, Pretreatment 5/10  -MS      Pain Scale: Numbers, Post-Treatment 5/10  -MS      Pain Location - Side Left  -MS      Pain Location hip  -MS      Pain Intervention(s) Medication (See MAR);Cold applied;Repositioned;Elevated;Rest  -MS      Recorded by [MS] Pablo Kaur, PT 11/05/18 1458      Row Name                 Wound 11/04/18 1551 Left hip incision    Wound - Properties Group Date first assessed: 11/04/18 [KK] Time first assessed: 1551 [KK] Side: Left [KK] Location: hip [KK] Type: incision [KK] Recorded by:  [ROSIO] Kina Lopez RN 11/04/18 1551      User Key  (r) = Recorded By, (t) = Taken By, (c) = Cosigned By    Initials Name Effective Dates Discipline    Pablo Arora, PT 04/03/18 -  PT    Kina Harding RN 09/30/16 -  Nurse          Wound 11/04/18 1551 Left hip incision (Active)   Dressing Appearance dry;intact 11/5/2018 12:24 PM   Closure SHAE 11/5/2018 12:24 PM   Base dressing in place, unable to visualize 11/5/2018 12:24 PM   Drainage Amount none 11/5/2018 12:24 PM               PT Rehab Goals     Row Name 11/05/18 0920             Bed Mobility Goal 1 (PT)    Activity/Assistive Device (Bed Mobility Goal 1, PT) bed mobility activities, all  -MS      Love Level/Cues Needed (Bed Mobility Goal 1, PT) standby assist  -MS      Time Frame (Bed Mobility Goal 1, PT) long term goal (LTG);3 days  -MS         Transfer Goal 1 (PT)    Activity/Assistive Device (Transfer Goal 1, PT) transfers, all;walker, rolling  -MS      Love Level/Cues Needed (Transfer Goal 1, PT) standby assist  -MS      Time Frame (Transfer Goal 1, PT) long term goal (LTG);3 days  -MS         Gait Training Goal 1 (PT)    Activity/Assistive Device (Gait Training Goal 1, PT) gait (walking locomotion);walker, rolling  -MS      Love Level (Gait Training Goal 1, PT) standby assist  -MS      Distance (Gait Goal 1, PT) 120 feet  -MS      Time Frame (Gait Training Goal 1, PT) long term goal (LTG);3 days  -MS        User Key  (r) = Recorded By, (t) = Taken By, (c) = Cosigned By    Initials Name Provider Type Discipline    MS Pablo Kaur, PT Physical Therapist PT          Physical Therapy Education     Title: PT OT SLP Therapies (Done)     Topic: Physical Therapy (Done)     Point: Mobility training (Done)    Learning Progress  Summary     Learner Status Readiness Method Response Comment Documented by    Patient Done Acceptance EANNABELLE VU,NR  MS 11/05/18 1459     Done Acceptance ED VU,NR  MS 11/05/18 0925          Point: Home exercise program (Done)    Learning Progress Summary     Learner Status Readiness Method Response Comment Documented by    Patient Done Acceptance ANNABELLE CUNNINGHAM VU,NR  MS 11/05/18 1459     Done Acceptance ED VU,NR  MS 11/05/18 0925          Point: Body mechanics (Done)    Learning Progress Summary     Learner Status Readiness Method Response Comment Documented by    Patient Done Acceptance ED VU,NR  MS 11/05/18 1459     Done Acceptance ED VU,NR  MS 11/05/18 0925          Point: Precautions (Done)    Learning Progress Summary     Learner Status Readiness Method Response Comment Documented by    Patient Done Acceptance ANNABELLE CUNNINGHAM,NR  MS 11/05/18 1459     Done Acceptance OCTAVIOD VU,NR  MS 11/05/18 0925                      User Key     Initials Effective Dates Name Provider Type Discipline    MS 04/03/18 -  Pablo Kaur, PT Physical Therapist PT                    PT Recommendation and Plan  Anticipated Discharge Disposition (PT): home with assist, home with home health, skilled nursing facility (Pending pt. progress)  Planned Therapy Interventions (PT Eval): balance training, bed mobility training, gait training, home exercise program, patient/family education, postural re-education, ROM (range of motion), strengthening, transfer training  Therapy Frequency (PT Clinical Impression): 2 times/day  Outcome Summary/Treatment Plan (PT)  Anticipated Discharge Disposition (PT): home with assist, home with home health, skilled nursing facility (Pending pt. progress)  Plan of Care Reviewed With: patient  Progress: improving  Outcome Summary: Improved tolerance to functional activity this PM with an increase in gait distance, decreased assist required for overall functional mobility, and a progression in ther. ex. protocol.   Pt. also able to  initiate step through gait pattern during ambulation.          Outcome Measures     Row Name 11/05/18 1500 11/05/18 0900          How much help from another person do you currently need...    Turning from your back to your side while in flat bed without using bedrails? 3  -MS 3  -MS     Moving from lying on back to sitting on the side of a flat bed without bedrails? 3  -MS 3  -MS     Moving to and from a bed to a chair (including a wheelchair)? 3  -MS 3  -MS     Standing up from a chair using your arms (e.g., wheelchair, bedside chair)? 3  -MS 2  -MS     Climbing 3-5 steps with a railing? 2  -MS 2  -MS     To walk in hospital room? 3  -MS 3  -MS     AM-PAC 6 Clicks Score 17  -MS 16  -MS        Functional Assessment    Outcome Measure Options AM-PAC 6 Clicks Basic Mobility (PT)  -MS AM-PAC 6 Clicks Basic Mobility (PT)  -MS       User Key  (r) = Recorded By, (t) = Taken By, (c) = Cosigned By    Initials Name Provider Type    Pablo Arora, PT Physical Therapist           Time Calculation:         PT Charges     Row Name 11/05/18 1500 11/05/18 0926          Time Calculation    Start Time 1430  -MS 0825  -MS     Stop Time 1448  -MS 0845  -MS     Time Calculation (min) 18 min  -MS 20 min  -MS     PT Received On 11/05/18  -MS 11/05/18  -MS     PT - Next Appointment 11/06/18  -MS 11/05/18  -MS     PT Goal Re-Cert Due Date  -- 11/08/18  -MS        Time Calculation- PT    Total Timed Code Minutes- PT 15 minute(s)  -MS 15 minute(s)  -MS       User Key  (r) = Recorded By, (t) = Taken By, (c) = Cosigned By    Initials Name Provider Type    Pablo Arora, PT Physical Therapist        Therapy Suggested Charges     Code   Minutes Charges    None           Therapy Charges for Today     Code Description Service Date Service Provider Modifiers Qty    78738451364  PT EVAL LOW COMPLEXITY 1 11/5/2018 Pablo Kaur, PT GP 1    [id number]  PT THER PROC EA 15 MIN 11/5/2018 Pablo Kaur, PT GP 1    [id number]   PT THER PROC EA 15 MIN 11/5/2018 Pablo Kaur, PT GP 1          PT G-Codes  Outcome Measure Options: AM-PAC 6 Clicks Basic Mobility (PT)  AM-PAC 6 Clicks Score: 17    Pablo Kaur, PT  11/5/2018

## 2018-11-05 NOTE — PLAN OF CARE
Problem: Patient Care Overview  Goal: Plan of Care Review  Outcome: Ongoing (interventions implemented as appropriate)   11/05/18 3406   Coping/Psychosocial   Plan of Care Reviewed With patient   Plan of Care Review   Progress improving   OTHER   Outcome Summary Pt is a post op of a left bipolar hip. Dressing is clean dry and intact. Pt continues with the Telfa dressing. Pt got up to the floor before physical therapy could work with her. Pt continues with the valdez to bedside drainage, Will remove in AM. Pt took one dose of pain medicine this shift. Pt continues to mantain hip precautions. No comorbidities to address at this time,will continue to monitor.     Goal: Individualization and Mutuality  Outcome: Ongoing (interventions implemented as appropriate)    Goal: Discharge Needs Assessment  Outcome: Ongoing (interventions implemented as appropriate)    Goal: Interprofessional Rounds/Family Conf  Outcome: Ongoing (interventions implemented as appropriate)      Problem: Fall Risk (Adult)  Goal: Absence of Fall  Outcome: Ongoing (interventions implemented as appropriate)      Problem: Skin Injury Risk (Adult)  Goal: Skin Health and Integrity  Outcome: Ongoing (interventions implemented as appropriate)      Problem: Fracture Orthopaedic (Adult)  Goal: Signs and Symptoms of Listed Potential Problems Will be Absent, Minimized or Managed (Fracture Orthopaedic)  Outcome: Ongoing (interventions implemented as appropriate)

## 2018-11-05 NOTE — PROGRESS NOTES
"Daily progress note    Chief complaint  Doing better  No new complaints  Status post surgery    History of present illness  83-year-old white female with history of hyperlipidemia osteoarthritis otherwise in good health presented to [facility] emergency room after the fall with left lower extremity pain.  Patient stated that she slipped and fell and lost her balance.  Patient evaluated in ER found to have left humeral neck fracture Patient denies any loss of consciousness no chest pain shortness of breath dizziness abdominal pain nausea vomiting diarrhea.  Patient fully alert oriented hurting at the fracture site but no other complaint.     REVIEW OF SYSTEMS  Review of Systems   Constitutional: Negative for fever.   HENT: Negative for sore throat.    Eyes: Negative.    Respiratory: Negative for cough and shortness of breath.    Cardiovascular: Negative for chest pain.   Gastrointestinal: Negative for abdominal pain, diarrhea and vomiting.   Genitourinary: Negative for dysuria.   Musculoskeletal: Negative for neck pain.        L hip and LLE pain   Skin: Negative for rash.   Allergic/Immunologic: Negative.    Neurological: Negative for weakness, numbness and headaches.   Hematological: Negative.    Psychiatric/Behavioral: Negative.    All other systems reviewed and are negative.     PHYSICAL EXAM  Blood pressure 116/59, pulse 77, temperature 98.7 °F (37.1 °C), temperature source Oral, resp. rate 16, height 162.6 cm (64\"), weight 58.1 kg (128 lb), SpO2 92 %, not currently breastfeeding.    Constitutional: She is oriented to person, place, and time. No distress.   Head: Normocephalic and atraumatic.   Eyes: Pupils are equal, round, and reactive to light. EOM are normal.   Neck: Normal range of motion and full passive range of motion without pain. Neck supple. No spinous process tenderness and no muscular tenderness present.   Cardiovascular: Normal rate, regular rhythm and normal heart sounds.    No murmur " heard.  Pulmonary/Chest: Effort normal and breath sounds normal. No respiratory distress.   Abdominal: Soft. Bowel sounds are normal. She exhibits no distension. There is no tenderness. There is no rebound and no guarding.   Musculoskeletal: Normal range of motion. She exhibits no edema.   L spine nontender. LLE is shortened and externally rotated. Tenderness to the lateral L hip and middle third of the L thigh. Pain w/ internal rotation and flexion of the L hip. L Hand there is bruising on the palmar aspect at the base of the thumb, no snuff box tenderness, and no pain w/ axial loading of the L thumb.   Neurological: She is alert and oriented to person, place, and time. She has normal sensation and normal strength.   Skin: Skin is warm and dry. No rash noted.   Psychiatric: Mood and affect normal.     LAB RESULTS  Lab Results (last 24 hours)     Procedure Component Value Units Date/Time    BNP [[id number]]  (Normal) Collected:  11/05/18 0339    Specimen:  Blood Updated:  11/05/18 0451     proBNP 161.4 pg/mL     Narrative:       Among patients with dyspnea, NT-proBNP is highly sensitive for the detection of acute congestive heart failure. In addition NT-proBNP of <300 pg/ml effectively rules out acute congestive heart failure with 99% negative predictive value.    TSH [[id number]]  (Normal) Collected:  11/05/18 0339    Specimen:  Blood Updated:  11/05/18 0451     TSH 0.908 mIU/mL     Comprehensive Metabolic Panel [[id number]]  (Abnormal) Collected:  11/05/18 0339    Specimen:  Blood Updated:  11/05/18 0439     Glucose 148 (H) mg/dL      BUN 15 mg/dL      Creatinine 0.93 mg/dL      Sodium 139 mmol/L      Potassium 4.6 mmol/L      Chloride 103 mmol/L      CO2 24.7 mmol/L      Calcium 8.4 (L) mg/dL      Total Protein 6.5 g/dL      Albumin 3.50 g/dL      ALT (SGPT) 17 U/L      AST (SGOT) 19 U/L      Alkaline Phosphatase 57 U/L      Total Bilirubin 0.4 mg/dL      eGFR Non African Amer 58 (L) mL/min/1.73      Globulin 3.0  gm/dL      A/G Ratio 1.2 g/dL      BUN/Creatinine Ratio 16.1     Anion Gap 11.3 mmol/L     Narrative:       The MDRD GFR formula is only valid for adults with stable renal function between ages 18 and 70.    Lipid Panel [[id number]] Collected:  11/05/18 0339    Specimen:  Blood Updated:  11/05/18 0439     Total Cholesterol 142 mg/dL      Triglycerides 72 mg/dL      HDL Cholesterol 51 mg/dL      LDL Cholesterol  77 mg/dL      VLDL Cholesterol 14.4 mg/dL      LDL/HDL Ratio 1.50    Narrative:       Cholesterol Reference Ranges  (U.S. Department of Health and Human Services ATP III Classifications)    Desirable          <200 mg/dL  Borderline High    200-239 mg/dL  High Risk          >240 mg/dL      Triglyceride Reference Ranges  (U.S. Department of Health and Human Services ATP III Classifications)    Normal           <150 mg/dL  Borderline High  150-199 mg/dL  High             200-499 mg/dL  Very High        >500 mg/dL    HDL Reference Ranges  (U.S. Department of Health and Human Services ATP III Classifcations)    Low     <40 mg/dl (major risk factor for CHD)  High    >60 mg/dl ('negative' risk factor for CHD)        LDL Reference Ranges  (U.S. Department of Health and Human Services ATP III Classifcations)    Optimal          <100 mg/dL  Near Optimal     100-129 mg/dL  Borderline High  130-159 mg/dL  High             160-189 mg/dL  Very High        >189 mg/dL    Hemoglobin A1c [[id number]]  (Normal) Collected:  11/05/18 0339    Specimen:  Blood Updated:  11/05/18 0428     Hemoglobin A1C 5.50 %     Narrative:       Hemoglobin A1C Ranges:    Increased Risk for Diabetes  5.7% to 6.4%  Diabetes                     >= 6.5%  Diabetic Goal                < 7.0%    CBC & Differential [[id number]] Collected:  11/05/18 0339    Specimen:  Blood Updated:  11/05/18 0424    Narrative:       The following orders were created for panel order CBC & Differential.  Procedure                               Abnormality         Status                      ---------                               -----------         ------                     CBC Auto Differential[[id number]]        Abnormal            Final result                 Please view results for these tests on the individual orders.    CBC Auto Differential [[id number]]  (Abnormal) Collected:  11/05/18 0339    Specimen:  Blood Updated:  11/05/18 0424     WBC 9.84 10*3/mm3      RBC 3.91 10*6/mm3      Hemoglobin 11.7 (L) g/dL      Hematocrit 35.9 %      MCV 91.8 fL      MCH 29.9 pg      MCHC 32.6 g/dL      RDW 13.9 (H) %      RDW-SD 46.9 fl      MPV 9.1 fL      Platelets 236 10*3/mm3      Neutrophil % 78.7 (H) %      Lymphocyte % 9.6 (L) %      Monocyte % 11.6 %      Eosinophil % 0.0 (L) %      Basophil % 0.1 %      Immature Grans % 0.1 %      Neutrophils, Absolute 7.75 10*3/mm3      Lymphocytes, Absolute 0.94 10*3/mm3      Monocytes, Absolute 1.14 10*3/mm3      Eosinophils, Absolute 0.00 10*3/mm3      Basophils, Absolute 0.01 10*3/mm3      Immature Grans, Absolute 0.01 10*3/mm3         Imaging Results (last 24 hours)     Procedure Component Value Units Date/Time    XR Pelvis 1 or 2 View [[id number]] Collected:  11/04/18 1638     Updated:  11/04/18 1641    Narrative:       ONE VIEW PORTABLE AP PELVIS     HISTORY: Left hip replacement for fracture     The patient has had recent total hip replacement on the left and the  alignment appears satisfactory.     This report was finalized on 11/4/2018 4:38 PM by Dr. Khadar Palafox M.D.           EKG                              Rhythm/Rate: NSR 73  P waves and LA: nml  QRS, axis: RBBB   ST and T waves: RBBB       Current Facility-Administered Medications:   •  apixaban (ELIQUIS) tablet 2.5 mg, 2.5 mg, Oral, Q12H, Yousif Lee MD, 2.5 mg at 11/05/18 1021  •  aspirin chewable tablet 81 mg, 81 mg, Oral, Daily, Thai Serna MD  •  atorvastatin (LIPITOR) tablet 10 mg, 10 mg, Oral, Daily, Thai Serna MD, 10 mg at 11/04/18 2151  •  cetirizine (zyrTEC) tablet  10 mg, 10 mg, Oral, Daily, Maty Serna MD  •  HYDROcodone-acetaminophen (NORCO) 5-325 MG per tablet 1 tablet, 1 tablet, Oral, Q4H PRN, Yousif Lee MD, 1 tablet at 11/05/18 0948  •  HYDROcodone-acetaminophen (NORCO) 5-325 MG per tablet 2 tablet, 2 tablet, Oral, Q4H PRN, Yousif Lee MD  •  morphine injection 1 mg, 1 mg, Intravenous, Q4H PRN **OR** morphine injection 2 mg, 2 mg, Intravenous, Q4H PRN, Maty Serna MD, 2 mg at 11/04/18 0937  •  morphine injection 2 mg, 2 mg, Intravenous, Q2H PRN **AND** naloxone (NARCAN) injection 0.4 mg, 0.4 mg, Intravenous, Q5 Min PRN, Yousif Lee MD  •  ondansetron (ZOFRAN) injection 4 mg, 4 mg, Intravenous, Q6H PRN, Maty Serna MD, 4 mg at 11/04/18 1736  •  pantoprazole (PROTONIX) EC tablet 40 mg, 40 mg, Oral, Q AM, Maty Serna MD, 40 mg at 11/05/18 0639  •  sennosides-docusate sodium (SENOKOT-S) 8.6-50 MG tablet 2 tablet, 2 tablet, Oral, Nightly, Yousif Lee MD, 2 tablet at 11/04/18 2151  •  [COMPLETED] Insert peripheral IV, , , Once **AND** sodium chloride 0.9 % flush 10 mL, 10 mL, Intravenous, PRN, Sunil Elizabeth MD  •  sodium chloride 0.9 % with KCl 20 mEq/L infusion, 75 mL/hr, Intravenous, Continuous, Maty Serna MD     ASSESSMENT  Acute left femoral neck fracture status post left hip bipolar hemiarthroplasty   Status post fall  Hyperlipidemia  Osteoarthritis  Right bundle branch block    PLAN  CPM  Postop care   Pain management  Orthopedic surgery consult appreciated   Continue home medications and adjust the doses  Discussed with family including granddaughter who is a nurse  Stress ulcer DVT prophylaxis  Supportive care  PT/OT  Follow closely further recommendation according to hospital course    MATY SERNA MD

## 2018-11-05 NOTE — PLAN OF CARE
Problem: Patient Care Overview  Goal: Plan of Care Review   11/05/18 1046   Coping/Psychosocial   Plan of Care Reviewed With patient   Plan of Care Review   Progress improving   OTHER   Outcome Summary Improved tolerance to functional activity this PM with an increase in gait distance, decreased assist required for overall functional mobility, and a progression in ther. ex. protocol. Pt. also able to initiate step through gait pattern during ambulation.

## 2018-11-06 LAB
ANION GAP SERPL CALCULATED.3IONS-SCNC: 9.7 MMOL/L
BASOPHILS # BLD AUTO: 0.03 10*3/MM3 (ref 0–0.2)
BASOPHILS NFR BLD AUTO: 0.3 % (ref 0–1.5)
BUN BLD-MCNC: 16 MG/DL (ref 8–23)
BUN/CREAT SERPL: 19.5 (ref 7–25)
CALCIUM SPEC-SCNC: 8.5 MG/DL (ref 8.6–10.5)
CHLORIDE SERPL-SCNC: 100 MMOL/L (ref 98–107)
CO2 SERPL-SCNC: 25.3 MMOL/L (ref 22–29)
CREAT BLD-MCNC: 0.82 MG/DL (ref 0.57–1)
DEPRECATED RDW RBC AUTO: 47.4 FL (ref 37–54)
EOSINOPHIL # BLD AUTO: 0.28 10*3/MM3 (ref 0–0.7)
EOSINOPHIL NFR BLD AUTO: 3.1 % (ref 0.3–6.2)
ERYTHROCYTE [DISTWIDTH] IN BLOOD BY AUTOMATED COUNT: 13.9 % (ref 11.7–13)
GFR SERPL CREATININE-BSD FRML MDRD: 67 ML/MIN/1.73
GLUCOSE BLD-MCNC: 109 MG/DL (ref 65–99)
HCT VFR BLD AUTO: 34.5 % (ref 35.6–45.5)
HGB BLD-MCNC: 10.9 G/DL (ref 11.9–15.5)
IMM GRANULOCYTES # BLD: 0.01 10*3/MM3 (ref 0–0.03)
IMM GRANULOCYTES NFR BLD: 0.1 % (ref 0–0.5)
LYMPHOCYTES # BLD AUTO: 2.21 10*3/MM3 (ref 0.9–4.8)
LYMPHOCYTES NFR BLD AUTO: 24.9 % (ref 19.6–45.3)
MCH RBC QN AUTO: 29.5 PG (ref 26.9–32)
MCHC RBC AUTO-ENTMCNC: 31.6 G/DL (ref 32.4–36.3)
MCV RBC AUTO: 93.5 FL (ref 80.5–98.2)
MONOCYTES # BLD AUTO: 0.79 10*3/MM3 (ref 0.2–1.2)
MONOCYTES NFR BLD AUTO: 8.9 % (ref 5–12)
NEUTROPHILS # BLD AUTO: 5.58 10*3/MM3 (ref 1.9–8.1)
NEUTROPHILS NFR BLD AUTO: 62.8 % (ref 42.7–76)
PLATELET # BLD AUTO: 211 10*3/MM3 (ref 140–500)
PMV BLD AUTO: 9.3 FL (ref 6–12)
POTASSIUM BLD-SCNC: 3.7 MMOL/L (ref 3.5–5.2)
RBC # BLD AUTO: 3.69 10*6/MM3 (ref 3.9–5.2)
SODIUM BLD-SCNC: 135 MMOL/L (ref 136–145)
WBC NRBC COR # BLD: 8.89 10*3/MM3 (ref 4.5–10.7)

## 2018-11-06 PROCEDURE — 85025 COMPLETE CBC W/AUTO DIFF WBC: CPT | Performed by: HOSPITALIST

## 2018-11-06 PROCEDURE — 97165 OT EVAL LOW COMPLEX 30 MIN: CPT

## 2018-11-06 PROCEDURE — 97535 SELF CARE MNGMENT TRAINING: CPT

## 2018-11-06 PROCEDURE — 80048 BASIC METABOLIC PNL TOTAL CA: CPT | Performed by: HOSPITALIST

## 2018-11-06 PROCEDURE — 97110 THERAPEUTIC EXERCISES: CPT

## 2018-11-06 RX ADMIN — APIXABAN 2.5 MG: 2.5 TABLET, FILM COATED ORAL at 22:37

## 2018-11-06 RX ADMIN — HYDROCODONE BITARTRATE AND ACETAMINOPHEN 1 TABLET: 5; 325 TABLET ORAL at 14:03

## 2018-11-06 RX ADMIN — HYDROCODONE BITARTRATE AND ACETAMINOPHEN 1 TABLET: 5; 325 TABLET ORAL at 22:37

## 2018-11-06 RX ADMIN — PANTOPRAZOLE SODIUM 40 MG: 40 TABLET, DELAYED RELEASE ORAL at 07:17

## 2018-11-06 RX ADMIN — POLYETHYLENE GLYCOL 3350 17 G: 17 POWDER, FOR SOLUTION ORAL at 15:38

## 2018-11-06 RX ADMIN — HYDROCODONE BITARTRATE AND ACETAMINOPHEN 1 TABLET: 5; 325 TABLET ORAL at 07:17

## 2018-11-06 RX ADMIN — APIXABAN 2.5 MG: 2.5 TABLET, FILM COATED ORAL at 08:27

## 2018-11-06 RX ADMIN — DOCUSATE SODIUM -SENNOSIDES 2 TABLET: 50; 8.6 TABLET, COATED ORAL at 22:37

## 2018-11-06 NOTE — PLAN OF CARE
Problem: Patient Care Overview  Goal: Plan of Care Review  Outcome: Ongoing (interventions implemented as appropriate)   11/06/18 9563   Coping/Psychosocial   Plan of Care Reviewed With patient   Plan of Care Review   Progress improving   OTHER   Outcome Summary Slept well ovnerihgt. Minimal pain with PO pain pills controlling. Ambulating with 1 asssit to BRP. Continue to monitor.        Problem: Fall Risk (Adult)  Goal: Absence of Fall  Outcome: Ongoing (interventions implemented as appropriate)      Problem: Skin Injury Risk (Adult)  Goal: Skin Health and Integrity  Outcome: Ongoing (interventions implemented as appropriate)      Problem: Fracture Orthopaedic (Adult)  Goal: Signs and Symptoms of Listed Potential Problems Will be Absent, Minimized or Managed (Fracture Orthopaedic)  Outcome: Ongoing (interventions implemented as appropriate)

## 2018-11-06 NOTE — PLAN OF CARE
Problem: Patient Care Overview  Goal: Plan of Care Review   11/06/18 1047   Coping/Psychosocial   Plan of Care Reviewed With patient   Plan of Care Review   Progress improving   OTHER   Outcome Summary Improved tolerance to functional activity this AM with an increase in gait distance and progression of ther. ex. protocol.

## 2018-11-06 NOTE — PROGRESS NOTES
"Daily progress note    Chief complaint  Doing better  No new complaints    History of present illness  83-year-old white female with history of hyperlipidemia osteoarthritis otherwise in good health presented to [facility] emergency room after the fall with left lower extremity pain.  Patient stated that she slipped and fell and lost her balance.  Patient evaluated in ER found to have left humeral neck fracture Patient denies any loss of consciousness no chest pain shortness of breath dizziness abdominal pain nausea vomiting diarrhea.  Patient fully alert oriented hurting at the fracture site but no other complaint.     REVIEW OF SYSTEMS  Review of Systems   Constitutional: Negative for fever.   HENT: Negative for sore throat.    Eyes: Negative.    Respiratory: Negative for cough and shortness of breath.    Cardiovascular: Negative for chest pain.   Gastrointestinal: Negative for abdominal pain, diarrhea and vomiting.   Genitourinary: Negative for dysuria.   Musculoskeletal: Negative for neck pain.        L hip and LLE pain   Skin: Negative for rash.   Allergic/Immunologic: Negative.    Neurological: Negative for weakness, numbness and headaches.   Hematological: Negative.    Psychiatric/Behavioral: Negative.    All other systems reviewed and are negative.     PHYSICAL EXAM  Blood pressure 112/66, pulse 74, temperature 98.5 °F (36.9 °C), temperature source Oral, resp. rate 16, height 162.6 cm (64\"), weight 58.1 kg (128 lb), SpO2 90 %, not currently breastfeeding.    Constitutional: She is oriented to person, place, and time. No distress.   Head: Normocephalic and atraumatic.   Eyes: Pupils are equal, round, and reactive to light. EOM are normal.   Neck: Normal range of motion and full passive range of motion without pain. Neck supple. No spinous process tenderness and no muscular tenderness present.   Cardiovascular: Normal rate, regular rhythm and normal heart sounds.    No murmur " heard.  Pulmonary/Chest: Effort normal and breath sounds normal. No respiratory distress.   Abdominal: Soft. Bowel sounds are normal. She exhibits no distension. There is no tenderness. There is no rebound and no guarding.   Musculoskeletal: Normal range of motion. She exhibits no edema.   L spine nontender. LLE is shortened and externally rotated. Tenderness to the lateral L hip and middle third of the L thigh. Pain w/ internal rotation and flexion of the L hip. L Hand there is bruising on the palmar aspect at the base of the thumb, no snuff box tenderness, and no pain w/ axial loading of the L thumb.   Neurological: She is alert and oriented to person, place, and time. She has normal sensation and normal strength.   Skin: Skin is warm and dry. No rash noted.   Psychiatric: Mood and affect normal.     LAB RESULTS  Lab Results (last 24 hours)     Procedure Component Value Units Date/Time    Basic Metabolic Panel [[id number]]  (Abnormal) Collected:  11/06/18 0337    Specimen:  Blood Updated:  11/06/18 0421     Glucose 109 (H) mg/dL      BUN 16 mg/dL      Creatinine 0.82 mg/dL      Sodium 135 (L) mmol/L      Potassium 3.7 mmol/L      Chloride 100 mmol/L      CO2 25.3 mmol/L      Calcium 8.5 (L) mg/dL      eGFR Non African Amer 67 mL/min/1.73      BUN/Creatinine Ratio 19.5     Anion Gap 9.7 mmol/L     Narrative:       The MDRD GFR formula is only valid for adults with stable renal function between ages 18 and 70.    CBC & Differential [[id number]] Collected:  11/06/18 0337    Specimen:  Blood Updated:  11/06/18 0407    Narrative:       The following orders were created for panel order CBC & Differential.  Procedure                               Abnormality         Status                     ---------                               -----------         ------                     CBC Auto Differential[[id number]]        Abnormal            Final result                 Please view results for these tests on the individual  orders.    CBC Auto Differential [[id number]]  (Abnormal) Collected:  11/06/18 0337    Specimen:  Blood Updated:  11/06/18 0407     WBC 8.89 10*3/mm3      RBC 3.69 (L) 10*6/mm3      Hemoglobin 10.9 (L) g/dL      Hematocrit 34.5 (L) %      MCV 93.5 fL      MCH 29.5 pg      MCHC 31.6 (L) g/dL      RDW 13.9 (H) %      RDW-SD 47.4 fl      MPV 9.3 fL      Platelets 211 10*3/mm3      Neutrophil % 62.8 %      Lymphocyte % 24.9 %      Monocyte % 8.9 %      Eosinophil % 3.1 %      Basophil % 0.3 %      Immature Grans % 0.1 %      Neutrophils, Absolute 5.58 10*3/mm3      Lymphocytes, Absolute 2.21 10*3/mm3      Monocytes, Absolute 0.79 10*3/mm3      Eosinophils, Absolute 0.28 10*3/mm3      Basophils, Absolute 0.03 10*3/mm3      Immature Grans, Absolute 0.01 10*3/mm3         Imaging Results (last 24 hours)     ** No results found for the last 24 hours. **        EKG                              Rhythm/Rate: NSR 73  P waves and OR: nml  QRS, axis: RBBB   ST and T waves: RBBB       Current Facility-Administered Medications:   •  apixaban (ELIQUIS) tablet 2.5 mg, 2.5 mg, Oral, Q12H, Yousif Lee MD, 2.5 mg at 11/06/18 0827  •  aspirin chewable tablet 81 mg, 81 mg, Oral, Daily, Thai Serna MD, 81 mg at 11/05/18 2033  •  atorvastatin (LIPITOR) tablet 10 mg, 10 mg, Oral, Daily, Thai Serna MD, 10 mg at 11/05/18 2033  •  cetirizine (zyrTEC) tablet 10 mg, 10 mg, Oral, Daily, Thai Serna MD, 10 mg at 11/05/18 2033  •  HYDROcodone-acetaminophen (NORCO) 5-325 MG per tablet 1 tablet, 1 tablet, Oral, Q4H PRN, Yousif Lee MD, 1 tablet at 11/06/18 0717  •  HYDROcodone-acetaminophen (NORCO) 5-325 MG per tablet 2 tablet, 2 tablet, Oral, Q4H PRN, Yousif Lee MD  •  morphine injection 1 mg, 1 mg, Intravenous, Q4H PRN **OR** morphine injection 2 mg, 2 mg, Intravenous, Q4H PRN, Thai Serna MD, 2 mg at 11/04/18 0937  •  morphine injection 2 mg, 2 mg, Intravenous, Q2H PRN **AND** naloxone (NARCAN) injection 0.4 mg, 0.4 mg,  Intravenous, Q5 Min PRN, Yousif Lee MD  •  ondansetron (ZOFRAN) injection 4 mg, 4 mg, Intravenous, Q6H PRN, Maty Serna MD, 4 mg at 11/04/18 1736  •  pantoprazole (PROTONIX) EC tablet 40 mg, 40 mg, Oral, Q AM, Maty Serna MD, 40 mg at 11/06/18 0717  •  sennosides-docusate sodium (SENOKOT-S) 8.6-50 MG tablet 2 tablet, 2 tablet, Oral, Nightly, Yousif Lee MD, 2 tablet at 11/05/18 2033  •  [COMPLETED] Insert peripheral IV, , , Once **AND** sodium chloride 0.9 % flush 10 mL, 10 mL, Intravenous, PRN, Sunil Elizabeth MD     ASSESSMENT  Acute left femoral neck fracture status post left hip bipolar hemiarthroplasty   Status post fall  Hyperlipidemia  Osteoarthritis  Right bundle branch block    PLAN  CPM  Postop care   Pain management  Continue home medications and adjust the doses  Discussed with family including granddaughter who is a nurse  Stress ulcer DVT prophylaxis  Supportive care  PT/OT  Subacute rehabilitation in a.m.    MATY SERNA MD

## 2018-11-06 NOTE — THERAPY TREATMENT NOTE
"Acute Care - Physical Therapy Treatment Note  [facility]     Patient Name: Tereso Allan  : 1935  MRN: [id number]  Today's Date: 2018  Onset of Illness/Injury or Date of Surgery: 18  Date of Referral to PT: 18  Referring Physician: Yousif Sifuentes    Admit Date: 11/3/2018    Visit Dx:    ICD-10-CM ICD-9-CM   1. Closed displaced fracture of left femoral neck (CMS/HCC) S72.002A 820.8   2. Fall, initial encounter W19.XXXA E888.9   3. Difficulty walking R26.2 719.7     Patient Active Problem List   Diagnosis   • Closed displaced fracture of left femoral neck (CMS/HCC)       Therapy Treatment          Rehabilitation Treatment Summary     Row Name 18 1045             Treatment Time/Intention    Discipline physical therapist  -MS      Document Type therapy note (daily note)  -MS      Subjective Information complains of;pain  -MS      Mode of Treatment physical therapy;individual therapy  -MS      Patient Effort good  -MS      Comment Pt. reports continued soreness in her Left hip this AM but overall pt. reports feeling \"better\"  -MS      Existing Precautions/Restrictions hip, posterior   Left L.E.  -MS      Recorded by [MS] Pablo Kaur, PT 18 1047      Row Name 18 1045             Cognitive Assessment/Intervention- PT/OT    Orientation Status (Cognition) oriented x 3  -MS      Follows Commands (Cognition) WNL  -MS      Personal Safety Interventions fall prevention program maintained;gait belt;nonskid shoes/slippers when out of bed;supervised activity  -MS      Recorded by [MS] Pablo Kaur, PT 18 1047      Row Name 18 1045             Mobility Assessment/Intervention    Left Lower Extremity (Weight-bearing Status) weight-bearing as tolerated (WBAT)  -MS      Recorded by [MS] Pablo Kaur, PT 18 1047      Row Name 18 1045             Bed Mobility Assessment/Treatment    Comment (Bed Mobility) Up in chair this AM.  -MS      Recorded by " [MS] Pablo Kaur, PT 11/06/18 1047      Row Name 11/06/18 1045             Sit-Stand Transfer    Sit-Stand Hopkins (Transfers) contact guard  -MS      Assistive Device (Sit-Stand Transfers) walker, front-wheeled  -MS      Recorded by [MS] Pablo Kaur, PT 11/06/18 1047      Row Name 11/06/18 1045             Stand-Sit Transfer    Stand-Sit Hopkins (Transfers) contact guard  -MS      Assistive Device (Stand-Sit Transfers) walker, front-wheeled  -MS      Recorded by [MS] Pablo Kaur, PT 11/06/18 1047      Row Name 11/06/18 1045             Gait/Stairs Assessment/Training    Hopkins Level (Gait) contact guard  -MS      Assistive Device (Gait) walker, front-wheeled  -MS      Distance in Feet (Gait) 100 feet  -MS      Pattern (Gait) step-through  -MS      Deviations/Abnormal Patterns (Gait) antalgic  -MS      Recorded by [MS] Pablo Kaur, PT 11/06/18 1047      Row Name 11/06/18 1045             Therapeutic Exercise    Comment (Therapeutic Exercise) Left THR protocol x 20 reps completed with assist.  -MS      Recorded by [MS] Pablo Kaur, PT 11/06/18 1047      Row Name 11/06/18 1045             Positioning and Restraints    Pre-Treatment Position sitting in chair/recliner  -MS      Post Treatment Position chair  -MS      In Chair notified nsg;reclined;sitting;call light within reach;encouraged to call for assist;with family/caregiver  -MS      Recorded by [MS] Pablo Kaur, PT 11/06/18 1047      Row Name 11/06/18 1045             Pain Scale: Numbers Pre/Post-Treatment    Pain Scale: Numbers, Pretreatment 4/10  -MS      Pain Scale: Numbers, Post-Treatment 4/10  -MS      Pain Location - Side Left  -MS      Pain Location hip  -MS      Pain Intervention(s) Medication (See MAR);Cold applied;Repositioned;Elevated;Rest  -MS      Recorded by [MS] Pablo Kaur, PT 11/06/18 1047      Row Name                Wound 11/04/18 1551 Left hip incision    Wound - Properties Group Date  first assessed: 11/04/18 [KK] Time first assessed: 1551 [KK] Side: Left [KK] Location: hip [KK] Type: incision [KK] Recorded by:  [KK] Kina Lopez, DONYA 11/04/18 1551      User Key  (r) = Recorded By, (t) = Taken By, (c) = Cosigned By    Initials Name Effective Dates Discipline    Pablo Arora KENTON, PT 04/03/18 -  PT    Kina Harding RN 09/30/16 -  Nurse          Wound 11/04/18 1551 Left hip incision (Active)   Dressing Appearance dry;intact 11/6/2018  8:27 AM   Closure SHAE 11/6/2018  8:27 AM   Base dressing in place, unable to visualize 11/6/2018  8:27 AM   Drainage Amount none 11/6/2018  8:27 AM             Physical Therapy Education     Title: PT OT SLP Therapies (Done)     Topic: Physical Therapy (Done)     Point: Mobility training (Done)    Learning Progress Summary     Learner Status Readiness Method Response Comment Documented by    Patient Done Acceptance E,D VU,NR  MS 11/06/18 1047     Done Acceptance E,D VU,NR  MS 11/05/18 1459     Done Acceptance E,D VU,NR  MS 11/05/18 0925          Point: Home exercise program (Done)    Learning Progress Summary     Learner Status Readiness Method Response Comment Documented by    Patient Done Acceptance E,D VU,NR  MS 11/06/18 1047     Done Acceptance E,D VU,NR  MS 11/05/18 1459     Done Acceptance E,D VU,NR  MS 11/05/18 0925          Point: Body mechanics (Done)    Learning Progress Summary     Learner Status Readiness Method Response Comment Documented by    Patient Done Acceptance E,D VU,NR  MS 11/06/18 1047     Done Acceptance E,D VU,NR  MS 11/05/18 1459     Done Acceptance E,D VU,NR  MS 11/05/18 0925          Point: Precautions (Done)    Learning Progress Summary     Learner Status Readiness Method Response Comment Documented by    Patient Done Acceptance E,D VU,NR  MS 11/06/18 1047     Done Acceptance E,D VU,NR  MS 11/05/18 1459     Done Acceptance E,D VU,NR  MS 11/05/18 0925                      User Key     Initials Effective Dates Name Provider Type  Discipline    MS 04/03/18 -  Pablo Kaur, PT Physical Therapist PT                    PT Recommendation and Plan  Anticipated Discharge Disposition (PT): home with assist, home with home health, skilled nursing facility (Pending pt. progress)  Planned Therapy Interventions (PT Eval): balance training, bed mobility training, gait training, home exercise program, patient/family education, postural re-education, ROM (range of motion), strengthening, transfer training  Therapy Frequency (PT Clinical Impression): 2 times/day  Outcome Summary/Treatment Plan (PT)  Anticipated Discharge Disposition (PT): home with assist, home with home health, skilled nursing facility (Pending pt. progress)  Plan of Care Reviewed With: patient  Progress: improving  Outcome Summary: Improved tolerance to functional activity this AM with an increase in gait distance and progression of ther. ex. protocol.          Outcome Measures     Row Name 11/06/18 1048 11/06/18 1024 11/05/18 1500       How much help from another person do you currently need...    Turning from your back to your side while in flat bed without using bedrails? 3  -MS  -- 3  -MS    Moving from lying on back to sitting on the side of a flat bed without bedrails? 3  -MS  -- 3  -MS    Moving to and from a bed to a chair (including a wheelchair)? 3  -MS  -- 3  -MS    Standing up from a chair using your arms (e.g., wheelchair, bedside chair)? 3  -MS  -- 3  -MS    Climbing 3-5 steps with a railing? 2  -MS  -- 2  -MS    To walk in hospital room? 3  -MS  -- 3  -MS    AM-PAC 6 Clicks Score 17  -MS  -- 17  -MS       How much help from another is currently needed...    Putting on and taking off regular lower body clothing?  -- 2  -SG  --    Bathing (including washing, rinsing, and drying)  -- 2  -SG  --    Toileting (which includes using toilet bed pan or urinal)  -- 2  -SG  --    Putting on and taking off regular upper body clothing  -- 3  -SG  --    Taking care of personal  grooming (such as brushing teeth)  -- 3  -SG  --    Eating meals  -- 4  -SG  --    Score  -- 16  -SG  --       Functional Assessment    Outcome Measure Options AM-PAC 6 Clicks Basic Mobility (PT)  -MS AM-PAC 6 Clicks Daily Activity (OT)  -SG AM-PAC 6 Clicks Basic Mobility (PT)  -MS    Row Name 11/05/18 0900             How much help from another person do you currently need...    Turning from your back to your side while in flat bed without using bedrails? 3  -MS      Moving from lying on back to sitting on the side of a flat bed without bedrails? 3  -MS      Moving to and from a bed to a chair (including a wheelchair)? 3  -MS      Standing up from a chair using your arms (e.g., wheelchair, bedside chair)? 2  -MS      Climbing 3-5 steps with a railing? 2  -MS      To walk in hospital room? 3  -MS      AM-PAC 6 Clicks Score 16  -MS         Functional Assessment    Outcome Measure Options AM-PAC 6 Clicks Basic Mobility (PT)  -MS        User Key  (r) = Recorded By, (t) = Taken By, (c) = Cosigned By    Initials Name Provider Type     Paola Mckeon, OTR Occupational Therapist    Pablo Arora, PT Physical Therapist           Time Calculation:         PT Charges     Row Name 11/06/18 1048             Time Calculation    Start Time 0952  -MS      Stop Time 1010  -MS      Time Calculation (min) 18 min  -MS      PT Received On 11/06/18  -MS      PT - Next Appointment 11/06/18  -MS         Time Calculation- PT    Total Timed Code Minutes- PT 15 minute(s)  -MS        User Key  (r) = Recorded By, (t) = Taken By, (c) = Cosigned By    Initials Name Provider Type    Pablo Arora, PT Physical Therapist        Therapy Suggested Charges     Code   Minutes Charges    None           Therapy Charges for Today     Code Description Service Date Service Provider Modifiers Qty    43592033532 HC PT EVAL LOW COMPLEXITY 1 11/5/2018 Pablo Kaur, PT GP 1    76281245907 HC PT THER PROC EA 15 MIN 11/5/2018 Pablo Kaur  L, PT GP 1    77679676778 HC PT THER PROC EA 15 MIN 11/5/2018 Pablo Kaur L, PT GP 1    83710371815 HC PT THER PROC EA 15 MIN 11/6/2018 Pablo Kaur, PT GP 1          PT G-Codes  Outcome Measure Options: AM-PAC 6 Clicks Basic Mobility (PT)  AM-PAC 6 Clicks Score: 17  Score: 16    Pablo Kaur, PT  11/6/2018

## 2018-11-06 NOTE — PROGRESS NOTES
Continued Stay Note  [facility]     Patient Name: Tereso Allan  MRN: [id number]  Today's Date: 11/6/2018    Admit Date: 11/3/2018          Discharge Plan     Row Name 11/06/18 0850       Plan    Plan [facility]     Plan Comments [facility] will have a skilled bed available on Wednesday 11/7.               Discharge Codes    No documentation.           Uzma Bobo RN

## 2018-11-06 NOTE — THERAPY TREATMENT NOTE
"Acute Care - Physical Therapy Treatment Note  [facility]     Patient Name: Tereso Allan  : 1935  MRN: [id number]  Today's Date: 2018  Onset of Illness/Injury or Date of Surgery: 18  Date of Referral to PT: 18  Referring Physician: Yousif Sifuentes    Admit Date: 11/3/2018    Visit Dx:    ICD-10-CM ICD-9-CM   1. Closed displaced fracture of left femoral neck (CMS/HCC) S72.002A 820.8   2. Fall, initial encounter W19.XXXA E888.9   3. Difficulty walking R26.2 719.7     Patient Active Problem List   Diagnosis   • Closed displaced fracture of left femoral neck (CMS/HCC)       Therapy Treatment          Rehabilitation Treatment Summary     Row Name 18 1447 18 1045          Treatment Time/Intention    Discipline physical therapist  -MS physical therapist  -MS     Document Type therapy note (daily note)  -MS therapy note (daily note)  -MS     Subjective Information complains of;fatigue;pain  -MS complains of;pain  -MS     Mode of Treatment physical therapy;individual therapy  -MS physical therapy;individual therapy  -MS     Patient Effort good  -MS good  -MS     Comment Pt. reports continued soreness in her Left hip this PM.  -MS Pt. reports continued soreness in her Left hip this AM but overall pt. reports feeling \"better\"  -MS     Existing Precautions/Restrictions fall;hip, posterior   Left L.E.  -MS hip, posterior   Left L.E.  -MS     Recorded by [MS] Pablo Kaur, PT 18 1449 [MS] Pablo Kaur, PT 18 1047     Row Name 18 1447 18 1045          Cognitive Assessment/Intervention- PT/OT    Orientation Status (Cognition) oriented x 3  -MS oriented x 3  -MS     Follows Commands (Cognition) WNL  -MS WNL  -MS     Personal Safety Interventions fall prevention program maintained;gait belt;nonskid shoes/slippers when out of bed;supervised activity  -MS fall prevention program maintained;gait belt;nonskid shoes/slippers when out of bed;supervised activity  " -MS     Recorded by [MS] Pablo Kaur, PT 11/06/18 1449 [MS] Pablo Kaur, PT 11/06/18 1047     Row Name 11/06/18 1447 11/06/18 1045          Mobility Assessment/Intervention    Left Lower Extremity (Weight-bearing Status) weight-bearing as tolerated (WBAT)  -MS weight-bearing as tolerated (WBAT)  -MS     Recorded by [MS] Pablo Kaur, PT 11/06/18 1449 [MS] Pablo Kaur, PT 11/06/18 1047     Row Name 11/06/18 1447 11/06/18 1045          Bed Mobility Assessment/Treatment    Comment (Bed Mobility) Pt. up in chair this PM.  -MS Up in chair this AM.  -MS     Recorded by [MS] Pablo Kaur, PT 11/06/18 1449 [MS] Pablo Kaur, PT 11/06/18 1047     Row Name 11/06/18 1447 11/06/18 1045          Sit-Stand Transfer    Sit-Stand [place] (Transfers) contact guard  -MS contact guard  -MS     Assistive Device (Sit-Stand Transfers) walker, front-wheeled  -MS walker, front-wheeled  -MS     Recorded by [MS] Pablo Kaur, PT 11/06/18 1449 [MS] Pablo Kaur, PT 11/06/18 1047     Row Name 11/06/18 1447 11/06/18 1045          Stand-Sit Transfer    Stand-Sit [place] (Transfers) contact guard  -MS contact guard  -MS     Assistive Device (Stand-Sit Transfers) walker, front-wheeled  -MS walker, front-wheeled  -MS     Recorded by [MS] Pablo Kaur, PT 11/06/18 1449 [MS] Pablo Kaur, PT 11/06/18 1047     Row Name 11/06/18 1447 11/06/18 1045          Gait/Stairs Assessment/Training    [place] Level (Gait) contact guard  -MS contact guard  -MS     Assistive Device (Gait) walker, front-wheeled  -MS walker, front-wheeled  -MS     Distance in Feet (Gait) 110 feet  - feet  -MS     Pattern (Gait) step-through  -MS step-through  -MS     Deviations/Abnormal Patterns (Gait) antalgic;kuldip decreased  -MS antalgic  -MS     Recorded by [MS] Pablo Kaur, PT 11/06/18 1449 [MS] Pablo Kaur, PT 11/06/18 1047     Row Name 11/06/18 1447 11/06/18 1045          Therapeutic  Exercise    Comment (Therapeutic Exercise) Left THR protocol x 25 reps completed with assist.   -MS Left THR protocol x 20 reps completed with assist.  -MS     Recorded by [MS] Pablo Kaur, PT 11/06/18 1449 [MS] Pablo Kaur, PT 11/06/18 1047     Row Name 11/06/18 1447 11/06/18 1045          Positioning and Restraints    Pre-Treatment Position sitting in chair/recliner  -MS sitting in chair/recliner  -MS     Post Treatment Position chair  -MS chair  -MS     In Chair notified nsg;reclined;sitting;call light within reach;encouraged to call for assist;with family/caregiver   Ice pack to Left hip.  -MS notified nsg;reclined;sitting;call light within reach;encouraged to call for assist;with family/caregiver  -MS     Recorded by [MS] Pablo Kaur, PT 11/06/18 1449 [MS] Pablo Kaur, PT 11/06/18 1047     Row Name 11/06/18 1447 11/06/18 1045          Pain Scale: Numbers Pre/Post-Treatment    Pain Scale: Numbers, Pretreatment 4/10  -MS 4/10  -MS     Pain Scale: Numbers, Post-Treatment 4/10  -MS 4/10  -MS     Pain Location - Side Left  -MS Left  -MS     Pain Location hip  -MS hip  -MS     Pain Intervention(s) Medication (See MAR);Cold applied;Repositioned;Elevated;Rest  -MS Medication (See MAR);Cold applied;Repositioned;Elevated;Rest  -MS     Recorded by [MS] Pablo Kaur, PT 11/06/18 1449 [MS] Pablo Kaur, PT 11/06/18 1047     Row Name                Wound 11/04/18 1551 Left hip incision    Wound - Properties Group Date first assessed: 11/04/18 [KK] Time first assessed: 1551 [KK] Side: Left [KK] Location: hip [KK] Type: incision [KK] Recorded by:  [ROSIO] Kina Lopez, DONYA 11/04/18 1551      User Key  (r) = Recorded By, (t) = Taken By, (c) = Cosigned By    Initials Name Effective Dates Discipline    MS Pablo Kaur, PT 04/03/18 -  PT    KK Kina Lopez, RN 09/30/16 -  Nurse          Wound 11/04/18 6427 Left hip incision (Active)   Dressing Appearance dry;intact 11/6/2018 12:48 PM    Closure SHAE 11/6/2018 12:48 PM   Base dressing in place, unable to visualize 11/6/2018 12:48 PM   Drainage Amount none 11/6/2018 12:48 PM             Physical Therapy Education     Title: PT OT SLP Therapies (Done)     Topic: Physical Therapy (Done)     Point: Mobility training (Done)    Learning Progress Summary     Learner Status Readiness Method Response Comment Documented by    Patient Done Acceptance E,D VU,NR  MS 11/06/18 1449     Done Acceptance E,D VU,NR  MS 11/06/18 1047     Done Acceptance E,D VU,NR  MS 11/05/18 1459     Done Acceptance E,D VU,NR  MS 11/05/18 0925          Point: Home exercise program (Done)    Learning Progress Summary     Learner Status Readiness Method Response Comment Documented by    Patient Done Acceptance E,D VU,NR  MS 11/06/18 1449     Done Acceptance E,D VU,NR  MS 11/06/18 1047     Done Acceptance E,D VU,NR  MS 11/05/18 1459     Done Acceptance E,D VU,NR  MS 11/05/18 0925          Point: Body mechanics (Done)    Learning Progress Summary     Learner Status Readiness Method Response Comment Documented by    Patient Done Acceptance E,D VU,NR  MS 11/06/18 1449     Done Acceptance E,D VU,NR  MS 11/06/18 1047     Done Acceptance E,D VU,NR  MS 11/05/18 1459     Done Acceptance E,D VU,NR  MS 11/05/18 0925          Point: Precautions (Done)    Learning Progress Summary     Learner Status Readiness Method Response Comment Documented by    Patient Done Acceptance E,D VU,NR  MS 11/06/18 1449     Done Acceptance E,D VU,NR  MS 11/06/18 1047     Done Acceptance E,D VU,NR  MS 11/05/18 1459     Done Acceptance E,D VU,NR  MS 11/05/18 0925                      User Key     Initials Effective Dates Name Provider Type Discipline    MS 04/03/18 -  Pablo Kaur, PT Physical Therapist PT                    PT Recommendation and Plan  Anticipated Discharge Disposition (PT): home with assist, home with home health, skilled nursing facility (Pending pt. progress)  Planned Therapy Interventions (PT  Eval): balance training, bed mobility training, gait training, home exercise program, patient/family education, postural re-education, ROM (range of motion), strengthening, transfer training  Therapy Frequency (PT Clinical Impression): 2 times/day  Outcome Summary/Treatment Plan (PT)  Anticipated Discharge Disposition (PT): home with assist, home with home health, skilled nursing facility (Pending pt. progress)  Plan of Care Reviewed With: patient  Progress: improving  Outcome Summary: Improved tolerance to functional activity this PM with an increase in gait distance and progression of ther. ex. program.  Pt. able to continue step through gait pattern with use of Rwx this PM.          Outcome Measures     Row Name 11/06/18 1400 11/06/18 1048 11/06/18 1024       How much help from another person do you currently need...    Turning from your back to your side while in flat bed without using bedrails? 3  -MS 3  -MS  --    Moving from lying on back to sitting on the side of a flat bed without bedrails? 3  -MS 3  -MS  --    Moving to and from a bed to a chair (including a wheelchair)? 3  -MS 3  -MS  --    Standing up from a chair using your arms (e.g., wheelchair, bedside chair)? 3  -MS 3  -MS  --    Climbing 3-5 steps with a railing? 2  -MS 2  -MS  --    To walk in hospital room? 3  -MS 3  -MS  --    AM-PAC 6 Clicks Score 17  -MS 17  -MS  --       How much help from another is currently needed...    Putting on and taking off regular lower body clothing?  --  -- 2  -SG    Bathing (including washing, rinsing, and drying)  --  -- 2  -SG    Toileting (which includes using toilet bed pan or urinal)  --  -- 2  -SG    Putting on and taking off regular upper body clothing  --  -- 3  -SG    Taking care of personal grooming (such as brushing teeth)  --  -- 3  -SG    Eating meals  --  -- 4  -SG    Score  --  -- 16  -SG       Functional Assessment    Outcome Measure Options AM-PAC 6 Clicks Basic Mobility (PT)  -MS AM-PAC 6 Clicks  Basic Mobility (PT)  -MS AM-PAC 6 Clicks Daily Activity (OT)  -SG    Row Name 11/05/18 1500 11/05/18 0900          How much help from another person do you currently need...    Turning from your back to your side while in flat bed without using bedrails? 3  -MS 3  -MS     Moving from lying on back to sitting on the side of a flat bed without bedrails? 3  -MS 3  -MS     Moving to and from a bed to a chair (including a wheelchair)? 3  -MS 3  -MS     Standing up from a chair using your arms (e.g., wheelchair, bedside chair)? 3  -MS 2  -MS     Climbing 3-5 steps with a railing? 2  -MS 2  -MS     To walk in hospital room? 3  -MS 3  -MS     AM-PAC 6 Clicks Score 17  -MS 16  -MS        Functional Assessment    Outcome Measure Options AM-PAC 6 Clicks Basic Mobility (PT)  -MS AM-PAC 6 Clicks Basic Mobility (PT)  -MS       User Key  (r) = Recorded By, (t) = Taken By, (c) = Cosigned By    Initials Name Provider Type    Paola Magaña, OTR Occupational Therapist    Pablo Arora, PT Physical Therapist           Time Calculation:         PT Charges     Row Name 11/06/18 1450 11/06/18 1048          Time Calculation    Start Time 1427  -MS 0952  -MS     Stop Time 1446  -MS 1010  -MS     Time Calculation (min) 19 min  -MS 18 min  -MS     PT Received On 11/06/18  -MS 11/06/18  -MS     PT - Next Appointment 11/07/18  -MS 11/06/18  -MS        Time Calculation- PT    Total Timed Code Minutes- PT 15 minute(s)  -MS 15 minute(s)  -MS       User Key  (r) = Recorded By, (t) = Taken By, (c) = Cosigned By    Initials Name Provider Type    Pablo Arora, PT Physical Therapist        Therapy Suggested Charges     Code   Minutes Charges    None           Therapy Charges for Today     Code Description Service Date Service Provider Modifiers Qty    22709839644 HC PT EVAL LOW COMPLEXITY 1 11/5/2018 Pablo Kaur, PT GP 1    58758089551 HC PT THER PROC EA 15 MIN 11/5/2018 Pablo Kaur, PT GP 1    86366086508 HC PT THER  PROC EA 15 MIN 11/5/2018 Pablo Kaur, PT GP 1    48725265928 HC PT THER PROC EA 15 MIN 11/6/2018 Pablo Kaur, PT GP 1    82364559515 HC PT THER PROC EA 15 MIN 11/6/2018 Pablo Kaur, PT GP 1          PT G-Codes  Outcome Measure Options: AM-PAC 6 Clicks Basic Mobility (PT)  AM-PAC 6 Clicks Score: 17  Score: 16    Pablo Kaur, PT  11/6/2018

## 2018-11-06 NOTE — THERAPY DISCHARGE NOTE
Acute Care - Occupational Therapy Initial Eval/Discharge  [facility]     Patient Name: Tereso Allan  : 1935  MRN: [id number]  Today's Date: 2018  Onset of Illness/Injury or Date of Surgery: 18     Referring Physician: Yousif Sifuentes      Admit Date: 11/3/2018       ICD-10-CM ICD-9-CM   1. Closed displaced fracture of left femoral neck (CMS/HCC) S72.002A 820.8   2. Fall, initial encounter W19.XXXA E888.9   3. Difficulty walking R26.2 719.7     Patient Active Problem List   Diagnosis   • Closed displaced fracture of left femoral neck (CMS/HCC)     Past Medical History:   Diagnosis Date   • Elevated cholesterol    • Hyperlipidemia      Past Surgical History:   Procedure Laterality Date   • APPENDECTOMY     • COLONOSCOPY     • EYE SURGERY     • FRACTURE SURGERY     • HIP ENDOPROSTHESIS Left 2018    Procedure: LEFT HIP BIPOLAR;  Surgeon: Yousif Lee MD;  Location: [facility] OR;  Service: Orthopedics   • JOINT REPLACEMENT     • REPLACEMENT TOTAL HIP ONCOLOGIC Right           OT ASSESSMENT FLOWSHEET (last 72 hours)      Occupational Therapy Evaluation     Row Name 18 1017                   OT Evaluation Time/Intention    Subjective Information no complaints  -SG        Document Type evaluation;discharge evaluation/summary  -SG        Mode of Treatment occupational therapy  -SG        Patient Effort good  -SG           General Information    Patient Profile Reviewed? yes  -SG        Patient Observations alert;cooperative;agree to therapy  -SG        Prior Level of Function independent:;ADL's  -SG        Existing Precautions/Restrictions hip, posterior  -SG           Cognitive Assessment/Intervention- PT/OT    Orientation Status (Cognition) oriented x 3  -SG        Follows Commands (Cognition) WFL  -SG           ADL Assessment/Intervention    BADL Assessment/Intervention bathing;lower body dressing;grooming;toileting  -SG           Bathing Assessment/Intervention    Bathing  [place] Level bathing skills  -SG        Comment (Bathing) educated pt and family with AE to assist and hip safety  -SG           Lower Body Dressing Assessment/Training    Lower Body Dressing [place] Level lower body dressing skills  -SG        Assistive Devices (Lower Body Dressing) long-handled shoe horn;reacher;sock-aid  -SG        Lower Body Dressing Position supported sitting  -SG        Comment (Lower Body Dressing) educated pt and family with hip precautions and AE to assist with LE adls  -SG           Grooming Assessment/Training    [place] Level (Grooming) grooming skills  -SG        Comment (Grooming) educated with hip precautions  -SG           BADL Safety/Performance    Skilled BADL Treatment/Intervention adaptive equipment training;BADL process/adaptation training  -SG           Positioning and Restraints    Pre-Treatment Position sitting in chair/recliner  -SG        Post Treatment Position chair  -SG        In Chair reclined;call light within reach;encouraged to call for assist;with family/caregiver  -SG           Pain Scale: Numbers Pre/Post-Treatment    Pain Scale: Numbers, Pretreatment 0/10 - no pain  -SG        Pain Scale: Numbers, Post-Treatment 0/10 - no pain  -SG           Wound 11/04/18 1551 Left hip incision    Wound - Properties Group Date first assessed: 11/04/18  -KK Time first assessed: 1551  -KK Side: Left  -KK Location: hip  -KK Type: incision  -KK       Clinical Impression (OT)    OT Diagnosis need for assist with personal care  -SG           Patient Education Goal (OT)    Activity (Patient Education Goal, OT) pt to state good knowledge for hip precautions and AE to assist with LE adls  -SG        [place]/Cues/Accuracy (Memory Goal 2, OT) verbalizes understanding  -SG        Time Frame (Patient Education Goal, OT) 1 day  -SG        Progress/Outcome (Patient Education Goal, OT) goal met  -SG          User Key  (r) = Recorded By, (t) = Taken By, (c) = Cosigned By     Initials Name Effective Dates     Paola Mckeon OTR 06/08/18 -     Kina Harding RN 09/30/16 -           Occupational Therapy Education     Title: PT OT SLP Therapies (Done)     Topic: Occupational Therapy (Resolved)     Point: ADL training (Resolved)     Description: Instruct learner(s) on proper safety adaptation and remediation techniques during self care or transfers.   Instruct in proper use of assistive devices.   Learning Progress Summary     Learner Status Readiness Method Response Comment Documented by    Patient Done Acceptance E,TB,D VU Pt and family states good understanding for hip precautions and AE to assist with adls.  11/06/18 1022    Family Done Acceptance E,TB,D VU Pt and family states good understanding for hip precautions and AE to assist with adls.  11/06/18 1022          Point: Precautions (Resolved)     Description: Instruct learner(s) on prescribed precautions during self-care and functional transfers.   Learning Progress Summary     Learner Status Readiness Method Response Comment Documented by    Patient Done Acceptance E,TB,D VU Pt and family states good understanding for hip precautions and AE to assist with adls.  11/06/18 1022    Family Done Acceptance E,TB,D VU Pt and family states good understanding for hip precautions and AE to assist with adls.  11/06/18 1022                      User Key     Initials Effective Dates Name Provider Type Discipline     06/08/18 -  Paola Mckeon OTR Occupational Therapist OT                OT Recommendation and Plan     Plan of Care Review  Plan of Care Reviewed With: patient, family  Plan of Care Reviewed With: patient, family  Outcome Summary: Pt and family states good knowledge for hip precautions and AE to assist with adls. Pt may benefit from continued teaching for hip safety and AE teaching with adls           OT Rehab Goals     Row Name 11/06/18 1017             Patient Education Goal (OT)    Activity (Patient Education  Goal, OT) pt to state good knowledge for hip precautions and AE to assist with LE adls  -SG      [place]/Cues/Accuracy (Memory Goal 2, OT) verbalizes understanding  -SG      Time Frame (Patient Education Goal, OT) 1 day  -SG      Progress/Outcome (Patient Education Goal, OT) goal met  -SG        User Key  (r) = Recorded By, (t) = Taken By, (c) = Cosigned By    Initials Name Provider Type Discipline    Paola Magaña OTR Occupational Therapist OT                Outcome Measures     Row Name 11/06/18 1024 11/05/18 1500 11/05/18 0900       How much help from another person do you currently need...    Turning from your back to your side while in flat bed without using bedrails?  -- 3  -MS 3  -MS    Moving from lying on back to sitting on the side of a flat bed without bedrails?  -- 3  -MS 3  -MS    Moving to and from a bed to a chair (including a wheelchair)?  -- 3  -MS 3  -MS    Standing up from a chair using your arms (e.g., wheelchair, bedside chair)?  -- 3  -MS 2  -MS    Climbing 3-5 steps with a railing?  -- 2  -MS 2  -MS    To walk in hospital room?  -- 3  -MS 3  -MS    AM-PAC 6 Clicks Score  -- 17  -MS 16  -MS       How much help from another is currently needed...    Putting on and taking off regular lower body clothing? 2  -SG  --  --    Bathing (including washing, rinsing, and drying) 2  -SG  --  --    Toileting (which includes using toilet bed pan or urinal) 2  -SG  --  --    Putting on and taking off regular upper body clothing 3  -SG  --  --    Taking care of personal grooming (such as brushing teeth) 3  -SG  --  --    Eating meals 4  -SG  --  --    Score 16  -SG  --  --       Functional Assessment    Outcome Measure Options AM-PAC 6 Clicks Daily Activity (OT)  -SG AM-PAC 6 Clicks Basic Mobility (PT)  -MS AM-PAC 6 Clicks Basic Mobility (PT)  -MS      User Key  (r) = Recorded By, (t) = Taken By, (c) = Cosigned By    Initials Name Provider Type    Paola Magaña OTR Occupational Therapist     Pablo Arora, PT Physical Therapist          Time Calculation:         Time Calculation- OT     Row Name 11/06/18 1026             Time Calculation- OT    OT Start Time 0831  -SG      OT Stop Time 0852  -      OT Time Calculation (min) 21 min  -      Total Timed Code Minutes- OT 12 minute(s)  -      OT Received On 11/06/18  -        User Key  (r) = Recorded By, (t) = Taken By, (c) = Cosigned By    Initials Name Provider Type     Paola Mckeon OTR Occupational Therapist        Therapy Suggested Charges     Code   Minutes Charges    None           Therapy Charges for Today     Code Description Service Date Service Provider Modifiers Qty    14280105706  OT EVAL LOW COMPLEXITY 2 11/6/2018 Paola Mckeon OTR GO 1    [id number]  OT SELF CARE/MGMT/TRAIN EA 15 MIN 11/6/2018 Paola Mckeon OTR GO 1               OT Discharge Summary  Reason for Discharge: other (comment) (Pt may benefit from OT at snu level for continued teaching with functional tasks and ADls)    JANICE Johnson  11/6/2018

## 2018-11-06 NOTE — PLAN OF CARE
Problem: Patient Care Overview  Goal: Plan of Care Review   11/06/18 1449   Coping/Psychosocial   Plan of Care Reviewed With patient   Plan of Care Review   Progress improving   OTHER   Outcome Summary Improved tolerance to functional activity this PM with an increase in gait distance and progression of ther. ex. program. Pt. able to continue step through gait pattern with use of Rwx this PM.

## 2018-11-06 NOTE — PLAN OF CARE
Problem: Patient Care Overview  Goal: Plan of Care Review  Outcome: Ongoing (interventions implemented as appropriate)   11/06/18 1449 11/06/18 2152   Coping/Psychosocial   Plan of Care Reviewed With patient --    Plan of Care Review   Progress improving --    OTHER   Outcome Summary --  POD# 2 of [place] hip. A&Ox4. C/o mild pain today. Tolerating oral pain medication well. Up with assistance. Voiding on her own. Dressing to left hip c/d/i. Pedal pulses 2+. Good sensation and motion to BLE. VItals are stable. Plans to D/c tomorrow to Rehab.      Goal: Individualization and Mutuality  Outcome: Ongoing (interventions implemented as appropriate)    Goal: Discharge Needs Assessment  Outcome: Ongoing (interventions implemented as appropriate)    Goal: Interprofessional Rounds/Family Conf  Outcome: Ongoing (interventions implemented as appropriate)      Problem: Fall Risk (Adult)  Goal: Absence of Fall  Outcome: Ongoing (interventions implemented as appropriate)      Problem: Skin Injury Risk (Adult)  Goal: Skin Health and Integrity  Outcome: Ongoing (interventions implemented as appropriate)      Problem: Fracture Orthopaedic (Adult)  Goal: Signs and Symptoms of Listed Potential Problems Will be Absent, Minimized or Managed (Fracture Orthopaedic)  Outcome: Ongoing (interventions implemented as appropriate)

## 2018-11-07 VITALS
TEMPERATURE: 99.2 F | SYSTOLIC BLOOD PRESSURE: 108 MMHG | BODY MASS INDEX: 21.85 KG/M2 | WEIGHT: 128 LBS | OXYGEN SATURATION: 93 % | HEIGHT: 64 IN | HEART RATE: 82 BPM | RESPIRATION RATE: 18 BRPM | DIASTOLIC BLOOD PRESSURE: 70 MMHG

## 2018-11-07 LAB
BASOPHILS # BLD AUTO: 0.05 10*3/MM3 (ref 0–0.2)
BASOPHILS NFR BLD AUTO: 0.6 % (ref 0–1.5)
DEPRECATED RDW RBC AUTO: 48.3 FL (ref 37–54)
EOSINOPHIL # BLD AUTO: 0.52 10*3/MM3 (ref 0–0.7)
EOSINOPHIL NFR BLD AUTO: 6.3 % (ref 0.3–6.2)
ERYTHROCYTE [DISTWIDTH] IN BLOOD BY AUTOMATED COUNT: 13.8 % (ref 11.7–13)
HCT VFR BLD AUTO: 34.2 % (ref 35.6–45.5)
HGB BLD-MCNC: 10.6 G/DL (ref 11.9–15.5)
IMM GRANULOCYTES # BLD: 0.02 10*3/MM3 (ref 0–0.03)
IMM GRANULOCYTES NFR BLD: 0.2 % (ref 0–0.5)
LYMPHOCYTES # BLD AUTO: 2.35 10*3/MM3 (ref 0.9–4.8)
LYMPHOCYTES NFR BLD AUTO: 28.5 % (ref 19.6–45.3)
MCH RBC QN AUTO: 29.8 PG (ref 26.9–32)
MCHC RBC AUTO-ENTMCNC: 31 G/DL (ref 32.4–36.3)
MCV RBC AUTO: 96.1 FL (ref 80.5–98.2)
MONOCYTES # BLD AUTO: 1.09 10*3/MM3 (ref 0.2–1.2)
MONOCYTES NFR BLD AUTO: 13.2 % (ref 5–12)
NEUTROPHILS # BLD AUTO: 4.23 10*3/MM3 (ref 1.9–8.1)
NEUTROPHILS NFR BLD AUTO: 51.2 % (ref 42.7–76)
PLATELET # BLD AUTO: 212 10*3/MM3 (ref 140–500)
PMV BLD AUTO: 9.2 FL (ref 6–12)
RBC # BLD AUTO: 3.56 10*6/MM3 (ref 3.9–5.2)
WBC NRBC COR # BLD: 8.26 10*3/MM3 (ref 4.5–10.7)

## 2018-11-07 PROCEDURE — 85025 COMPLETE CBC W/AUTO DIFF WBC: CPT | Performed by: HOSPITALIST

## 2018-11-07 PROCEDURE — 97110 THERAPEUTIC EXERCISES: CPT

## 2018-11-07 RX ORDER — HYDROCODONE BITARTRATE AND ACETAMINOPHEN 5; 325 MG/1; MG/1
1 TABLET ORAL EVERY 4 HOURS PRN
Qty: 10 TABLET | Refills: 0 | Status: SHIPPED | OUTPATIENT
Start: 2018-11-07 | End: 2018-11-10

## 2018-11-07 RX ORDER — PANTOPRAZOLE SODIUM 40 MG/1
40 TABLET, DELAYED RELEASE ORAL DAILY
Qty: 30 TABLET | Refills: 0 | Status: SHIPPED | OUTPATIENT
Start: 2018-11-07 | End: 2018-12-07

## 2018-11-07 RX ORDER — SENNA AND DOCUSATE SODIUM 50; 8.6 MG/1; MG/1
2 TABLET, FILM COATED ORAL NIGHTLY
Qty: 60 TABLET | Refills: 0 | Status: SHIPPED | OUTPATIENT
Start: 2018-11-07 | End: 2018-12-07

## 2018-11-07 RX ADMIN — HYDROCODONE BITARTRATE AND ACETAMINOPHEN 1 TABLET: 5; 325 TABLET ORAL at 13:47

## 2018-11-07 RX ADMIN — ATORVASTATIN CALCIUM 10 MG: 10 TABLET, FILM COATED ORAL at 00:38

## 2018-11-07 RX ADMIN — HYDROCODONE BITARTRATE AND ACETAMINOPHEN 1 TABLET: 5; 325 TABLET ORAL at 07:50

## 2018-11-07 RX ADMIN — APIXABAN 2.5 MG: 2.5 TABLET, FILM COATED ORAL at 07:50

## 2018-11-07 RX ADMIN — CETIRIZINE HYDROCHLORIDE 10 MG: 10 TABLET, FILM COATED ORAL at 00:39

## 2018-11-07 RX ADMIN — POLYETHYLENE GLYCOL 3350 17 G: 17 POWDER, FOR SOLUTION ORAL at 07:50

## 2018-11-07 RX ADMIN — PANTOPRAZOLE SODIUM 40 MG: 40 TABLET, DELAYED RELEASE ORAL at 06:35

## 2018-11-07 NOTE — PROGRESS NOTES
Orthopedic Progress Note    Subjective :   Doing well, mild pain.    Objective :    Vital signs in last 24 hours:  Temp:  [98.1 °F (36.7 °C)-101 °F (38.3 °C)] 99.2 °F (37.3 °C)  Heart Rate:  [74-86] 82  Resp:  [16-18] 18  BP: (108-124)/(66-73) 108/70  Vitals:    11/06/18 2245 11/06/18 2300 11/07/18 0300 11/07/18 0708   BP:  124/67 124/73 108/70   BP Location:  Right arm Right arm Left arm   Patient Position:  Lying Lying Sitting   Pulse:  86 75 82   Resp:  18 18 18   Temp: (!) 100.9 °F (38.3 °C) 99.4 °F (37.4 °C) 98.1 °F (36.7 °C) 99.2 °F (37.3 °C)   TempSrc:  Oral Oral Oral   SpO2:  92% 92% 93%   Weight:       Height:           PHYSICAL EXAM:  Patient is calm, in no acute distress, awake and oriented x 3.  Dressing clean, dry and intact.  No signs of infection.  Swelling is appropriate.  Ecchymosis is appropriate in amount.  Homans test is negative.  Patient is neurovascularly intact distally.    LABS:    Results from last 7 days  Lab Units 11/07/18  0321   WBC 10*3/mm3 8.26   HEMOGLOBIN g/dL 10.6*   HEMATOCRIT % 34.2*   PLATELETS 10*3/mm3 212       Results from last 7 days  Lab Units 11/06/18  0337   SODIUM mmol/L 135*   POTASSIUM mmol/L 3.7   CHLORIDE mmol/L 100   CO2 mmol/L 25.3   BUN mg/dL 16   CREATININE mg/dL 0.82   GLUCOSE mg/dL 109*   CALCIUM mg/dL 8.5*       Results from last 7 days  Lab Units 11/03/18  1951   INR  1.02       ASSESSMENT:  Status post left hip bipolar, POD #3.    Plan:  Continue Physical Therapy, WBAT.  Continue SCDs, Continue lovenox x 2 weeks for DVT prophylaxis.  Dispo planning for rehab when medically stable.    Yousif Lee MD    Date: 11/7/2018  Time: 8:14 AM

## 2018-11-07 NOTE — PROGRESS NOTES
Continued Stay Note  [facility]     Patient Name: Tereso Allan  MRN: [id number]  Today's Date: 11/7/2018    Admit Date: 11/3/2018          Discharge Plan     Row Name 11/07/18 1457       Plan    Final Discharge Disposition Code 03 - skilled nursing facility (SNF)    Final Note Pt d/c'ed to [facility] skilled. Per Terese with [facility] bed available.              Discharge Codes    No documentation.       Expected Discharge Date and Time     Expected Discharge Date Expected Discharge Time    Nov 7, 2018             Ratna Meyer RN

## 2018-11-07 NOTE — PLAN OF CARE
Problem: Patient Care Overview  Goal: Plan of Care Review  Outcome: Ongoing (interventions implemented as appropriate)   11/07/18 0557   Coping/Psychosocial   Plan of Care Reviewed With patient   OTHER   Outcome Summary minimal pain, po pain meds effective, ambulating, temp 101 and 100.9 last night, no BM yet, hoping to be transfered to rehab today.     Goal: Individualization and Mutuality  Outcome: Ongoing (interventions implemented as appropriate)    Goal: Discharge Needs Assessment  Outcome: Ongoing (interventions implemented as appropriate)      Problem: Fall Risk (Adult)  Goal: Absence of Fall  Outcome: Ongoing (interventions implemented as appropriate)      Problem: Skin Injury Risk (Adult)  Goal: Skin Health and Integrity  Outcome: Ongoing (interventions implemented as appropriate)

## 2018-11-07 NOTE — PLAN OF CARE
Problem: Patient Care Overview  Goal: Plan of Care Review  Outcome: Ongoing (interventions implemented as appropriate)   11/07/18 1200   Coping/Psychosocial   Plan of Care Reviewed With patient   Plan of Care Review   Progress improving   OTHER   Outcome Summary pt doing well post surgery, showing steady progress with functional mobility, plans to go to rehab today

## 2018-11-07 NOTE — PROGRESS NOTES
"Daily progress note    Chief complaint  Doing better  No new complaints    History of present illness  83-year-old white female with history of hyperlipidemia osteoarthritis otherwise in good health presented to [facility] emergency room after the fall with left lower extremity pain.  Patient stated that she slipped and fell and lost her balance.  Patient evaluated in ER found to have left humeral neck fracture Patient denies any loss of consciousness no chest pain shortness of breath dizziness abdominal pain nausea vomiting diarrhea.  Patient fully alert oriented hurting at the fracture site but no other complaint.     REVIEW OF SYSTEMS  Review of Systems   Constitutional: Negative for fever.   HENT: Negative for sore throat.    Eyes: Negative.    Respiratory: Negative for cough and shortness of breath.    Cardiovascular: Negative for chest pain.   Gastrointestinal: Negative for abdominal pain, diarrhea and vomiting.   Genitourinary: Negative for dysuria.   Musculoskeletal: Negative for neck pain.        L hip and LLE pain   Skin: Negative for rash.   Allergic/Immunologic: Negative.    Neurological: Negative for weakness, numbness and headaches.   Hematological: Negative.    Psychiatric/Behavioral: Negative.    All other systems reviewed and are negative.     PHYSICAL EXAM  Blood pressure 108/70, pulse 82, temperature 99.2 °F (37.3 °C), temperature source Oral, resp. rate 18, height 162.6 cm (64\"), weight 58.1 kg (128 lb), SpO2 93 %, not currently breastfeeding.    Constitutional: She is oriented to person, place, and time. No distress.   Head: Normocephalic and atraumatic.   Eyes: Pupils are equal, round, and reactive to light. EOM are normal.   Neck: Normal range of motion and full passive range of motion without pain. Neck supple. No spinous process tenderness and no muscular tenderness present.   Cardiovascular: Normal rate, regular rhythm and normal heart sounds.    No murmur " heard.  Pulmonary/Chest: Effort normal and breath sounds normal. No respiratory distress.   Abdominal: Soft. Bowel sounds are normal. She exhibits no distension. There is no tenderness. There is no rebound and no guarding.   Musculoskeletal: Normal range of motion. She exhibits no edema.   L spine nontender. LLE is shortened and externally rotated. Tenderness to the lateral L hip and middle third of the L thigh. Pain w/ internal rotation and flexion of the L hip. L Hand there is bruising on the palmar aspect at the base of the thumb, no snuff box tenderness, and no pain w/ axial loading of the L thumb.   Neurological: She is alert and oriented to person, place, and time. She has normal sensation and normal strength.   Skin: Skin is warm and dry. No rash noted.   Psychiatric: Mood and affect normal.     LAB RESULTS  Lab Results (last 24 hours)     Procedure Component Value Units Date/Time    CBC & Differential [[id number]] Collected:  11/07/18 0321    Specimen:  Blood Updated:  11/07/18 0429    Narrative:       The following orders were created for panel order CBC & Differential.  Procedure                               Abnormality         Status                     ---------                               -----------         ------                     CBC Auto Differential[[id number]]        Abnormal            Final result                 Please view results for these tests on the individual orders.    CBC Auto Differential [[id number]]  (Abnormal) Collected:  11/07/18 0321    Specimen:  Blood Updated:  11/07/18 0429     WBC 8.26 10*3/mm3      RBC 3.56 (L) 10*6/mm3      Hemoglobin 10.6 (L) g/dL      Hematocrit 34.2 (L) %      MCV 96.1 fL      MCH 29.8 pg      MCHC 31.0 (L) g/dL      RDW 13.8 (H) %      RDW-SD 48.3 fl      MPV 9.2 fL      Platelets 212 10*3/mm3      Neutrophil % 51.2 %      Lymphocyte % 28.5 %      Monocyte % 13.2 (H) %      Eosinophil % 6.3 (H) %      Basophil % 0.6 %      Immature Grans % 0.2 %       Neutrophils, Absolute 4.23 10*3/mm3      Lymphocytes, Absolute 2.35 10*3/mm3      Monocytes, Absolute 1.09 10*3/mm3      Eosinophils, Absolute 0.52 10*3/mm3      Basophils, Absolute 0.05 10*3/mm3      Immature Grans, Absolute 0.02 10*3/mm3         Imaging Results (last 24 hours)     ** No results found for the last 24 hours. **        EKG                              Rhythm/Rate: NSR 73  P waves and DC: nml  QRS, axis: RBBB   ST and T waves: RBBB       Current Facility-Administered Medications:   •  apixaban (ELIQUIS) tablet 2.5 mg, 2.5 mg, Oral, Q12H, Yousif Lee MD, 2.5 mg at 11/07/18 0750  •  aspirin chewable tablet 81 mg, 81 mg, Oral, Daily, Thai Serna MD, 81 mg at 11/05/18 2033  •  atorvastatin (LIPITOR) tablet 10 mg, 10 mg, Oral, Daily, Thai Serna MD, 10 mg at 11/07/18 0038  •  cetirizine (zyrTEC) tablet 10 mg, 10 mg, Oral, Daily, Thai Serna MD, 10 mg at 11/07/18 0039  •  HYDROcodone-acetaminophen (NORCO) 5-325 MG per tablet 1 tablet, 1 tablet, Oral, Q4H PRN, Yousif Lee MD, 1 tablet at 11/07/18 0750  •  HYDROcodone-acetaminophen (NORCO) 5-325 MG per tablet 2 tablet, 2 tablet, Oral, Q4H PRN, Yousif Lee MD  •  ondansetron (ZOFRAN) injection 4 mg, 4 mg, Intravenous, Q6H PRN, Thai Serna MD, 4 mg at 11/04/18 1736  •  pantoprazole (PROTONIX) EC tablet 40 mg, 40 mg, Oral, Q AM, Thai Serna MD, 40 mg at 11/07/18 0635  •  polyethylene glycol 3350 powder (packet), 17 g, Oral, Daily, Thai Serna MD, 17 g at 11/07/18 0750  •  sennosides-docusate sodium (SENOKOT-S) 8.6-50 MG tablet 2 tablet, 2 tablet, Oral, Nightly, Yousif Lee MD, 2 tablet at 11/06/18 3880  •  [COMPLETED] Insert peripheral IV, , , Once **AND** sodium chloride 0.9 % flush 10 mL, 10 mL, Intravenous, PRN, Sunil Elizabeth MD     ASSESSMENT  Acute left femoral neck fracture status post left hip bipolar hemiarthroplasty   Status post fall  Hyperlipidemia  Osteoarthritis  Right bundle branch block    PLAN  Discharge to  subacute rehabilitation  Discharge summary dictated    MATY BETTENCOURT MD

## 2018-11-07 NOTE — THERAPY TREATMENT NOTE
Acute Care - Physical Therapy Treatment Note  [facility]     Patient Name: Tereso Allan  : 1935  MRN: [id number]  Today's Date: 2018  Onset of Illness/Injury or Date of Surgery: 18  Date of Referral to PT: 18  Referring Physician: Yousif Sifuentes    Admit Date: 11/3/2018    Visit Dx:    ICD-10-CM ICD-9-CM   1. Closed displaced fracture of left femoral neck (CMS/HCC) S72.002A 820.8   2. Fall, initial encounter W19.XXXA E888.9   3. Difficulty walking R26.2 719.7     Patient Active Problem List   Diagnosis   • Closed displaced fracture of left femoral neck (CMS/HCC)       Therapy Treatment          Rehabilitation Treatment Summary     Row Name 18 1157             Treatment Time/Intention    Discipline physical therapist  -PC      Document Type therapy note (daily note)  -PC      Subjective Information no complaints  -PC      Mode of Treatment physical therapy;individual therapy  -PC      Patient Effort good  -PC      Existing Precautions/Restrictions fall;hip, posterior   Left L.E.  -PC      Recorded by [PC] Susana Baptiste, PT 18 1159      Row Name 18 1157             Cognitive Assessment/Intervention- PT/OT    Orientation Status (Cognition) oriented x 4  -PC      Follows Commands (Cognition) WNL  -PC      Recorded by [PC] Susana Baptiste, PT 18 1159      Row Name 18 1157             Mobility Assessment/Intervention    Left Lower Extremity (Weight-bearing Status) weight-bearing as tolerated (WBAT)  -PC      Recorded by [PC] Susana Baptiste, PT 18 1159      Row Name 18 1157             Bed Mobility Assessment/Treatment    Comment (Bed Mobility) up in chair  -PC      Recorded by [PC] Susana Baptiste, PT 18 1159      Row Name 18 1157             Sit-Stand Transfer    Sit-Stand [place] (Transfers) contact guard  -PC      Assistive Device (Sit-Stand Transfers) walker, front-wheeled  -PC      Recorded by [PC] Susana Baptiste, PT  11/07/18 1159      Row Name 11/07/18 1157             Stand-Sit Transfer    Stand-Sit [place] (Transfers) contact guard  -PC      Assistive Device (Stand-Sit Transfers) walker, front-wheeled  -PC      Recorded by [PC] Susana Baptiste, PT 11/07/18 1159      Row Name 11/07/18 1157             Gait/Stairs Assessment/Training    [place] Level (Gait) contact guard  -PC      Assistive Device (Gait) walker, front-wheeled  -PC      Distance in Feet (Gait) 100 ft  -PC      Pattern (Gait) step-through  -PC      Deviations/Abnormal Patterns (Gait) antalgic;kuldip decreased  -PC      Recorded by [PC] Susana Baptiste, PT 11/07/18 1159      Row Name 11/07/18 1157             Therapeutic Exercise    Comment (Therapeutic Exercise) perf THR ex 10 reps  -PC      Recorded by [PC] Susana Baptiste, PT 11/07/18 1159      Row Name 11/07/18 1157             Pain Scale: Numbers Pre/Post-Treatment    Pain Scale: Numbers, Pretreatment 2/10   soreness  -PC      Pain Scale: Numbers, Post-Treatment 2/10  -PC      Pain Location - Side Left  -PC      Pain Location hip  -PC      Recorded by [PC] Susana Baptiste, PT 11/07/18 1159      Row Name                Wound 11/04/18 1551 Left hip incision    Wound - Properties Group Date first assessed: 11/04/18 [KK] Time first assessed: 1551 [KK] Side: Left [KK] Location: hip [KK] Type: incision [KK] Recorded by:  [KK] Kina Lopez RN 11/04/18 1551    Row Name 11/07/18 1157             Plan of Care Review    Plan of Care Reviewed With patient  -PC      Recorded by [PC] Susana Baptiste, PT 11/07/18 1159      Row Name 11/07/18 1157             Outcome Summary/Treatment Plan (PT)    Anticipated Discharge Disposition (PT) skilled nursing facility  -PC      Recorded by [PC] Susana Baptiste, PT 11/07/18 1159        User Key  (r) = Recorded By, (t) = Taken By, (c) = Cosigned By    Initials Name Effective Dates Discipline    PC Susana Baptiste, PT 04/03/18 -  PT    KK Kina Lopez RN 09/30/16 -   Nurse          Wound 11/04/18 1551 Left hip incision (Active)   Dressing Appearance dry;intact 11/7/2018  8:00 AM   Closure SHAE 11/7/2018  8:00 AM   Base dressing in place, unable to visualize 11/7/2018  8:00 AM   Drainage Amount none 11/7/2018  8:00 AM   Dressing Care, Wound other (see comments) 11/6/2018  8:30 PM             Physical Therapy Education     Title: PT OT SLP Therapies (Done)     Topic: Physical Therapy (Done)     Point: Mobility training (Done)    Learning Progress Summary     Learner Status Readiness Method Response Comment Documented by    Patient Done Acceptance E,D DU  PC 11/07/18 1159     Done Acceptance E,D VU,NR  MS 11/06/18 1449     Done Acceptance E,D VU,NR  MS 11/06/18 1047     Done Acceptance E,D VU,NR  MS 11/05/18 1459     Done Acceptance E,D VU,NR  MS 11/05/18 0925          Point: Home exercise program (Done)    Learning Progress Summary     Learner Status Readiness Method Response Comment Documented by    Patient Done Acceptance E,D DU  PC 11/07/18 1159     Done Acceptance E,D VU,NR  MS 11/06/18 1449     Done Acceptance E,D VU,NR  MS 11/06/18 1047     Done Acceptance E,D VU,NR  MS 11/05/18 1459     Done Acceptance E,D VU,NR  MS 11/05/18 0925          Point: Body mechanics (Done)    Learning Progress Summary     Learner Status Readiness Method Response Comment Documented by    Patient Done Acceptance E,D DU  PC 11/07/18 1159     Done Acceptance E,D VU,NR  MS 11/06/18 1449     Done Acceptance E,D VU,NR  MS 11/06/18 1047     Done Acceptance E,D VU,NR  MS 11/05/18 1459     Done Acceptance E,D VU,NR  MS 11/05/18 0925          Point: Precautions (Done)    Learning Progress Summary     Learner Status Readiness Method Response Comment Documented by    Patient Done Acceptance E,D DU  PC 11/07/18 1159     Done Acceptance E,D VU,NR  MS 11/06/18 1449     Done Acceptance E,D VU,NR  MS 11/06/18 1047     Done Acceptance E,D VU,NR  MS 11/05/18 1459     Done Acceptance E,D VU,NR  MS 11/05/18 0925                       User Key     Initials Effective Dates Name Provider Type Discipline    PC 04/03/18 -  Susana Baptiste, PT Physical Therapist PT    MS 04/03/18 -  Pablo Kaur, PT Physical Therapist PT                    PT Recommendation and Plan  Anticipated Discharge Disposition (PT): skilled nursing facility  Outcome Summary/Treatment Plan (PT)  Anticipated Discharge Disposition (PT): skilled nursing facility  Plan of Care Reviewed With: patient  Progress: improving  Outcome Summary: pt doing well post surgery, showing steady progress with functional mobility, plans to go to rehab today          Outcome Measures     Row Name 11/07/18 1200 11/06/18 1400 11/06/18 1048       How much help from another person do you currently need...    Turning from your back to your side while in flat bed without using bedrails? 3  -PC 3  -MS 3  -MS    Moving from lying on back to sitting on the side of a flat bed without bedrails? 3  -PC 3  -MS 3  -MS    Moving to and from a bed to a chair (including a wheelchair)? 3  -PC 3  -MS 3  -MS    Standing up from a chair using your arms (e.g., wheelchair, bedside chair)? 3  -PC 3  -MS 3  -MS    Climbing 3-5 steps with a railing? 2  -PC 2  -MS 2  -MS    To walk in hospital room? 3  -PC 3  -MS 3  -MS    AM-PAC 6 Clicks Score 17  -PC 17  -MS 17  -MS       Functional Assessment    Outcome Measure Options  -- AM-PAC 6 Clicks Basic Mobility (PT)  -MS AM-PAC 6 Clicks Basic Mobility (PT)  -MS    Row Name 11/06/18 1024 11/05/18 1500 11/05/18 0900       How much help from another person do you currently need...    Turning from your back to your side while in flat bed without using bedrails?  -- 3  -MS 3  -MS    Moving from lying on back to sitting on the side of a flat bed without bedrails?  -- 3  -MS 3  -MS    Moving to and from a bed to a chair (including a wheelchair)?  -- 3  -MS 3  -MS    Standing up from a chair using your arms (e.g., wheelchair, bedside chair)?  -- 3  -MS 2  -MS     Climbing 3-5 steps with a railing?  -- 2  -MS 2  -MS    To walk in hospital room?  -- 3  -MS 3  -MS    AM-PAC 6 Clicks Score  -- 17  -MS 16  -MS       How much help from another is currently needed...    Putting on and taking off regular lower body clothing? 2  -SG  --  --    Bathing (including washing, rinsing, and drying) 2  -SG  --  --    Toileting (which includes using toilet bed pan or urinal) 2  -SG  --  --    Putting on and taking off regular upper body clothing 3  -SG  --  --    Taking care of personal grooming (such as brushing teeth) 3  -SG  --  --    Eating meals 4  -SG  --  --    Score 16  -SG  --  --       Functional Assessment    Outcome Measure Options AM-PAC 6 Clicks Daily Activity (OT)  -SG AM-PAC 6 Clicks Basic Mobility (PT)  -MS AM-PAC 6 Clicks Basic Mobility (PT)  -MS      User Key  (r) = Recorded By, (t) = Taken By, (c) = Cosigned By    Initials Name Provider Type    SG Paola Mckeon, OTR Occupational Therapist    PC Susana Baptiste, PT Physical Therapist    Pablo Arora, PT Physical Therapist           Time Calculation:         PT Charges     Row Name 11/07/18 1201             Time Calculation    Start Time 1140  -PC      Stop Time 1203  -PC      Time Calculation (min) 23 min  -PC      PT Received On 11/07/18  -PC        User Key  (r) = Recorded By, (t) = Taken By, (c) = Cosigned By    Initials Name Provider Type    PC Susana Baptiste, PT Physical Therapist        Therapy Suggested Charges     Code   Minutes Charges    None           Therapy Charges for Today     Code Description Service Date Service Provider Modifiers Qty    [id number]  PT THER PROC EA 15 MIN 11/7/2018 Susana Baptiste, PT GP 2          PT G-Codes  Outcome Measure Options: AM-PAC 6 Clicks Basic Mobility (PT)  AM-PAC 6 Clicks Score: 17  Score: 16    Susana Baptiste PT  11/7/2018

## 2018-11-07 NOTE — PLAN OF CARE
Problem: Patient Care Overview  Goal: Plan of Care Review  Outcome: Outcome(s) achieved Date Met: 11/07/18 11/07/18 1200 11/07/18 1323   Coping/Psychosocial   Plan of Care Reviewed With patient --    Plan of Care Review   Progress improving --    OTHER   Outcome Summary --  POD# 3 of LTHA. A&Ox4. Tolerating po pain medication. Up with assistance. VOiding on her own. Dressing to left hip c/d/i. Patient being discharged to rehab today. Report called and given. IV discontinued with tip intact. Discharge instructions given to patient. Verbalized understanding. Vitals are stable. Belongings with patient.      Goal: Individualization and Mutuality  Outcome: Outcome(s) achieved Date Met: 11/07/18    Goal: Discharge Needs Assessment  Outcome: Outcome(s) achieved Date Met: 11/07/18    Goal: Interprofessional Rounds/Family Conf  Outcome: Outcome(s) achieved Date Met: 11/07/18      Problem: Fall Risk (Adult)  Goal: Absence of Fall  Outcome: Outcome(s) achieved Date Met: 11/07/18      Problem: Skin Injury Risk (Adult)  Goal: Skin Health and Integrity  Outcome: Outcome(s) achieved Date Met: 11/07/18      Problem: Fracture Orthopaedic (Adult)  Goal: Signs and Symptoms of Listed Potential Problems Will be Absent, Minimized or Managed (Fracture Orthopaedic)  Outcome: Outcome(s) achieved Date Met: 11/07/18

## 2018-11-07 NOTE — DISCHARGE SUMMARY
Discharge summary    Date of admission 11/3/2018  Date of discharge 11/7/2018    Final diagnosis  Acute left femoral neck fracture status post left hip bipolar hemiarthroplasty   Status post fall  Hyperlipidemia  Osteoarthritis  Right bundle branch block probably chronic but no old EKG to compare    Discharge medications    Current Facility-Administered Medications:   •  apixaban (ELIQUIS) tablet 2.5 mg, 2.5 mg, Oral, Q12H, Yousif Lee MD, 2.5 mg at 11/07/18 0750  •  aspirin chewable tablet 81 mg, 81 mg, Oral, Daily, Thai Serna MD, 81 mg at 11/05/18 2033  •  atorvastatin (LIPITOR) tablet 10 mg, 10 mg, Oral, Daily, Thai Serna MD, 10 mg at 11/07/18 0038  •  cetirizine (zyrTEC) tablet 10 mg, 10 mg, Oral, Daily, Thai Serna MD, 10 mg at 11/07/18 0039  •  HYDROcodone-acetaminophen (NORCO) 5-325 MG per tablet 1 tablet, 1 tablet, Oral, Q4H PRN, Yousif Lee MD, 1 tablet at 11/07/18 0750  •  pantoprazole (PROTONIX) EC tablet 40 mg, 40 mg, Oral, Q AM, Thai Serna MD, 40 mg at 11/07/18 0635  •  polyethylene glycol 3350 powder (packet), 17 g, Oral, Daily, Thai Serna MD, 17 g at 11/07/18 0750  •  sennosides-docusate sodium (SENOKOT-S) 8.6-50 MG tablet 2 tablet, 2 tablet, Oral, Nightly, Yousif Lee MD, 2 tablet at 11/06/18 6413     Consult obtained  Orthopedic surgery    Procedure  Left hip bipolar arthroplasty    Hospital course  83-year-old white female with no significant past medical history except osteoarthritis hyperlipidemia admitted through emergency room after the fall with left leg pain and workup revealed acute left femoral neck fracture.  Patient admitted and after medical clearance underwent a left hip bipolar hemiarthroplasty.  Postoperatively patient doing well and will be discharged to subacute rehabilitation for PT OT nursing care.  Patient will continue on DVT prophylaxis with Eliquis for 14 days.  Patient has found to have right bundle branch block which is probably chronic and will  follow with primary care doctor for any further workup.  Patient medically surgically stable to be discharged.    Discharge diet regular    Activity per PT OT    Medication as above    Further care per accepting physician at subacute rehabilitation    MATY BETTENCOURT MD

## 2019-05-07 ENCOUNTER — TELEPHONE (OUTPATIENT)
Dept: INTERNAL MEDICINE | Facility: CLINIC | Age: 84
End: 2019-05-07

## 2019-05-07 DIAGNOSIS — E78.5 HYPERLIPIDEMIA, UNSPECIFIED HYPERLIPIDEMIA TYPE: Primary | ICD-10-CM

## 2019-05-07 NOTE — TELEPHONE ENCOUNTER
Patient has appointment Thursday and was concerned about labs. Could you please call the patient if she need to come tomorrow for labs.

## 2019-05-09 ENCOUNTER — OFFICE VISIT (OUTPATIENT)
Dept: INTERNAL MEDICINE | Facility: CLINIC | Age: 84
End: 2019-05-09

## 2019-05-09 VITALS — WEIGHT: 120 LBS | HEIGHT: 64 IN | BODY MASS INDEX: 20.49 KG/M2

## 2019-05-09 DIAGNOSIS — L65.9 ALOPECIA: ICD-10-CM

## 2019-05-09 DIAGNOSIS — E78.49 OTHER HYPERLIPIDEMIA: Primary | ICD-10-CM

## 2019-05-09 PROBLEM — E78.5 HYPERLIPIDEMIA: Status: ACTIVE | Noted: 2019-05-09

## 2019-05-09 LAB
ALBUMIN SERPL-MCNC: 4 G/DL (ref 3.5–5.2)
ALBUMIN/GLOB SERPL: 1.3 G/DL
ALP SERPL-CCNC: 66 U/L (ref 39–117)
ALT SERPL-CCNC: 17 U/L (ref 1–33)
AST SERPL-CCNC: 18 U/L (ref 1–32)
BILIRUB SERPL-MCNC: 0.4 MG/DL (ref 0.2–1.2)
BUN SERPL-MCNC: 15 MG/DL (ref 8–23)
BUN/CREAT SERPL: 15.5 (ref 7–25)
CALCIUM SERPL-MCNC: 10.2 MG/DL (ref 8.6–10.5)
CHLORIDE SERPL-SCNC: 104 MMOL/L (ref 98–107)
CHOLEST SERPL-MCNC: 166 MG/DL (ref 0–200)
CO2 SERPL-SCNC: 29 MMOL/L (ref 22–29)
CREAT SERPL-MCNC: 0.97 MG/DL (ref 0.57–1)
GLOBULIN SER CALC-MCNC: 3.2 GM/DL
GLUCOSE SERPL-MCNC: 93 MG/DL (ref 65–99)
HDLC SERPL-MCNC: 34 MG/DL (ref 40–60)
LDLC SERPL CALC-MCNC: 89 MG/DL (ref 0–100)
Lab: NORMAL
POTASSIUM SERPL-SCNC: 4.9 MMOL/L (ref 3.5–5.2)
PROT SERPL-MCNC: 7.2 G/DL (ref 6–8.5)
SODIUM SERPL-SCNC: 143 MMOL/L (ref 136–145)
TRIGL SERPL-MCNC: 217 MG/DL (ref 0–150)
TSH SERPL DL<=0.005 MIU/L-ACNC: 3.45 MIU/ML (ref 0.27–4.2)
VLDLC SERPL CALC-MCNC: 43.4 MG/DL
WRITTEN AUTHORIZATION: NORMAL

## 2019-05-09 PROCEDURE — 99213 OFFICE O/P EST LOW 20 MIN: CPT | Performed by: INTERNAL MEDICINE

## 2019-05-09 RX ORDER — FERROUS SULFATE 325(65) MG
325 TABLET ORAL
Qty: 90 TABLET | Refills: 1 | COMMUNITY
Start: 2019-05-09 | End: 2021-02-03

## 2019-05-09 NOTE — PROGRESS NOTES
Assessment and Plan  Problems Addressed this Visit        Cardiovascular and Mediastinum    Hyperlipidemia - Primary      Other Visit Diagnoses     Alopecia        reassured that she will see a difference with the iron but it may take a while.        F/U and Patient Instructions    Return in about 6 months (around 11/9/2019).  There are no Patient Instructions on file for this visit.    Subjective    Tereso Allan is a 83 y.o. female being seen in our office today for Hyperlipidemia and Alopecia     History of the Present Illness  HPI Here for reg f/u- she is overall doing well but concerned about her hair loss.  Seems pretty recent. Saw derm about a basal cell ca on her back and asked about it- checked iron levels which showed a bit low and now on replacement.  Did great with hip replacement after fracture in November.  R arm cyst and nerve pain never got operated on- may be coming back again butnot bad enough to see anyone about it.   Patient History        Significant Past History  The following portions of the patient's history were reviewed and updated as appropriate:PMHroutine: Social history , Allergies, Current Medications, Active Problem List and Health Maintenance              Social History  She  reports that she has never smoked. She has never used smokeless tobacco. She reports that she does not drink alcohol or use drugs.                         Review of Symptoms  Review of Systems   Constitution: Negative for weight loss.   HENT: Negative.    Cardiovascular: Negative.    Respiratory: Negative.    Skin:        Hair loss   Musculoskeletal: Negative.      Objective  Vital Signs         BP Readings from Last 1 Encounters:   11/07/18 108/70     Wt Readings from Last 3 Encounters:   05/09/19 54.4 kg (120 lb)   11/03/18 58.1 kg (128 lb)   10/30/18 58.1 kg (128 lb)   Body mass index is 20.6 kg/m².          Physical Exam   Physical Exam   Cardiovascular: Normal rate and regular rhythm.   Pulmonary/Chest:  Effort normal and breath sounds normal.   Neurological: She is alert.   Skin:   Hair thinning on top of head     Data Reviewed    Recent Results (from the past 2016 hour(s))   Comprehensive metabolic panel    Collection Time: 05/08/19  9:51 AM   Result Value Ref Range    Glucose 93 65 - 99 mg/dL    BUN 15 8 - 23 mg/dL    Creatinine 0.97 0.57 - 1.00 mg/dL    eGFR Non African Am 55 (L) >60 mL/min/1.73    eGFR African Am 67 >60 mL/min/1.73    BUN/Creatinine Ratio 15.5 7.0 - 25.0    Sodium 143 136 - 145 mmol/L    Potassium 4.9 3.5 - 5.2 mmol/L    Chloride 104 98 - 107 mmol/L    Total CO2 29.0 22.0 - 29.0 mmol/L    Calcium 10.2 8.6 - 10.5 mg/dL    Total Protein 7.2 6.0 - 8.5 g/dL    Albumin 4.00 3.50 - 5.20 g/dL    Globulin 3.2 gm/dL    A/G Ratio 1.3 g/dL    Total Bilirubin 0.4 0.2 - 1.2 mg/dL    Alkaline Phosphatase 66 39 - 117 U/L    AST (SGOT) 18 1 - 32 U/L    ALT (SGPT) 17 1 - 33 U/L   Lipid panel    Collection Time: 05/08/19  9:51 AM   Result Value Ref Range    Total Cholesterol 166 0 - 200 mg/dL    Triglycerides 217 (H) 0 - 150 mg/dL    HDL Cholesterol 34 (L) 40 - 60 mg/dL    VLDL Cholesterol 43.4 mg/dL    LDL Cholesterol  89 0 - 100 mg/dL   TSH    Collection Time: 05/08/19  9:51 AM   Result Value Ref Range    TSH 3.450 0.270 - 4.200 mIU/mL   Please Note    Collection Time: 05/08/19  9:51 AM   Result Value Ref Range    Please note Comment    Written Authorization    Collection Time: 05/08/19  9:51 AM   Result Value Ref Range    Written Authorization Comment

## 2019-06-25 RX ORDER — ATORVASTATIN CALCIUM 10 MG/1
10 TABLET, FILM COATED ORAL DAILY
Qty: 90 TABLET | Refills: 3 | Status: SHIPPED | OUTPATIENT
Start: 2019-06-25 | End: 2020-03-23 | Stop reason: SDUPTHER

## 2019-10-02 DIAGNOSIS — E78.5 HYPERLIPIDEMIA, UNSPECIFIED HYPERLIPIDEMIA TYPE: Primary | ICD-10-CM

## 2019-10-03 DIAGNOSIS — E78.5 HYPERLIPIDEMIA, UNSPECIFIED HYPERLIPIDEMIA TYPE: Primary | ICD-10-CM

## 2019-10-03 DIAGNOSIS — E78.5 HYPERLIPIDEMIA, UNSPECIFIED HYPERLIPIDEMIA TYPE: ICD-10-CM

## 2019-10-03 LAB
CHOLEST SERPL-MCNC: 174 MG/DL (ref 0–200)
HDLC SERPL-MCNC: 48 MG/DL (ref 40–60)
LDLC SERPL CALC-MCNC: 101 MG/DL (ref 0–100)
TRIGL SERPL-MCNC: 123 MG/DL (ref 0–150)
VLDLC SERPL CALC-MCNC: 24.6 MG/DL

## 2019-10-04 LAB
ALBUMIN SERPL-MCNC: 4.4 G/DL (ref 3.5–5.2)
ALBUMIN/GLOB SERPL: 1.6 G/DL
ALP SERPL-CCNC: 69 U/L (ref 39–117)
ALT SERPL-CCNC: 23 U/L (ref 1–33)
AST SERPL-CCNC: 24 U/L (ref 1–32)
BILIRUB SERPL-MCNC: 0.4 MG/DL (ref 0.2–1.2)
BUN SERPL-MCNC: 19 MG/DL (ref 8–23)
BUN/CREAT SERPL: 18.4 (ref 7–25)
CALCIUM SERPL-MCNC: 9.7 MG/DL (ref 8.6–10.5)
CHLORIDE SERPL-SCNC: 103 MMOL/L (ref 98–107)
CO2 SERPL-SCNC: 25.7 MMOL/L (ref 22–29)
CREAT SERPL-MCNC: 1.03 MG/DL (ref 0.57–1)
GLOBULIN SER CALC-MCNC: 2.7 GM/DL
GLUCOSE SERPL-MCNC: 103 MG/DL (ref 65–99)
IRON SATN MFR SERPL: 23 % (ref 20–50)
IRON SERPL-MCNC: 80 MCG/DL (ref 37–145)
Lab: NORMAL
POTASSIUM SERPL-SCNC: 4.5 MMOL/L (ref 3.5–5.2)
PROT SERPL-MCNC: 7.1 G/DL (ref 6–8.5)
SODIUM SERPL-SCNC: 143 MMOL/L (ref 136–145)
TIBC SERPL-MCNC: 343 MCG/DL
UIBC SERPL-MCNC: 263 MCG/DL (ref 112–346)
WRITTEN AUTHORIZATION: NORMAL

## 2019-10-16 ENCOUNTER — OFFICE VISIT (OUTPATIENT)
Dept: INTERNAL MEDICINE | Facility: CLINIC | Age: 84
End: 2019-10-16

## 2019-10-16 VITALS — HEIGHT: 64 IN | WEIGHT: 129 LBS | BODY MASS INDEX: 22.02 KG/M2

## 2019-10-16 DIAGNOSIS — E78.5 HYPERLIPIDEMIA, UNSPECIFIED HYPERLIPIDEMIA TYPE: Primary | ICD-10-CM

## 2019-10-16 DIAGNOSIS — M81.0 AGE-RELATED OSTEOPOROSIS WITHOUT CURRENT PATHOLOGICAL FRACTURE: ICD-10-CM

## 2019-10-16 DIAGNOSIS — Z00.00 MEDICARE ANNUAL WELLNESS VISIT, SUBSEQUENT: ICD-10-CM

## 2019-10-16 DIAGNOSIS — E61.1 IRON DEFICIENCY: ICD-10-CM

## 2019-10-16 PROCEDURE — G0439 PPPS, SUBSEQ VISIT: HCPCS | Performed by: INTERNAL MEDICINE

## 2019-10-16 NOTE — PROGRESS NOTES
QUICK REFERENCE INFORMATION:  The ABCs of the Annual Wellness Visit    Subjective   History of Present Illness    Tereso Allan is a 84 y.o. female who presents for a Subsequent Wellness Visit.  Has some ankle swelling by the end of the day- has been more noticeable over this month.  If she standing on her feet a lot, it's even worse.  There is no associated SOB.  There is no swelling in the am.   Did some PT in August for thoracic back pain which has resolved.  She is still liking the [facility] home.         HEALTH RISK ASSESSMENT    1935    Recent Hospitalizations:  No hospitalization(s) within the last year..    Current Medical Providers:  Patient Care Team:  Thuy Garza MD as PCP - General (Internal Medicine)  Thuy aGrza MD as PCP - Claims Attributed    Smoking Status:  Social History     Tobacco Use   Smoking Status Never Smoker   Smokeless Tobacco Never Used       Alcohol Consumption:  Social History     Substance and Sexual Activity   Alcohol Use No     Fall Risk Screen:  STEADI Fall Risk Assessment was completed, and patient is at LOW risk for falls.Assessment completed on:10/16/2019  Depression Screen:   PHQ-2/PHQ-9 Depression Screening 10/16/2019   Little interest or pleasure in doing things 0   Feeling down, depressed, or hopeless 0   Trouble falling or staying asleep, or sleeping too much 0   Feeling tired or having little energy 0   Poor appetite or overeating 0   Feeling bad about yourself - or that you are a failure or have let yourself or your family down 0   Trouble concentrating on things, such as reading the newspaper or watching television 0   Moving or speaking so slowly that other people could have noticed. Or the opposite - being so fidgety or restless that you have been moving around a lot more than usual 0   Thoughts that you would be better off dead, or of hurting yourself in some way 0   Total Score 0       Health Habits and Functional and Cognitive Screening:  Functional &  Cognitive Status 10/16/2019   Do you have difficulty preparing food and eating? No   Do you have difficulty bathing yourself, getting dressed or grooming yourself? No   Do you have difficulty using the toilet? No   Do you have difficulty moving around from place to place? No   Do you have trouble with steps or getting out of a bed or a chair? No   Current Diet Well Balanced Diet   Dental Exam Up to date   Eye Exam Up to date   Exercise (times per week) 3 times per week   Current Exercise Activities Include Walking   Do you need help using the phone?  No   Are you deaf or do you have serious difficulty hearing?  No   Do you need help with transportation? No   Do you need help shopping? No   Do you need help preparing meals?  No   Do you need help with housework?  No   Do you need help with laundry? No   Do you need help taking your medications? No   Do you need help managing money? No   Have you felt unusual stress, anger or loneliness in the last month? No   Who do you live with? Alone   If you need help, do you have trouble finding someone available to you? No   Have you been bothered in the last four weeks by sexual problems? No   Do you have difficulty concentrating, remembering or making decisions? No     Activities of Daily Living  Do you have difficulty preparing food and eating?: No  Do you have difficulty bathing yourself, getting dressed or grooming yourself?: No  Do you have difficulty using the toilet?: No  Do you have difficulty moving around from place to place?: No  Do you have trouble with steps or getting out of a bed or a chair?: No  Instrumental Activities of Daily Living  Do you need help using the phone? : No  Are you deaf or do you have serious difficulty hearing? : No  Do you need help with transportation?: No  Do you need help shopping?: No  Do you need help preparing meals? : No  Do you need help with housework? : No  Do you need help with laundry?: No  Do you need help taking your  medications?: No  Do you need help managing money?: No  Psychosocial Risks  Have you felt unusual stress, anger or loneliness in the last month?: No  Who do you live with?: Alone  If you need help, do you have trouble finding someone available to you?: No  Have you been bothered in the last four weeks by sexual problems?: No  Cognitive Status  Do you have difficulty concentrating, remembering or making decisions?: No  Health Habits  Current Diet: Well Balanced Diet  Dental Exam: Up to date  Eye Exam: Up to date  Exercise (times per week): 3 times per week  Current Exercise Activities Include: Walking    Does the patient have evidence of cognitive impairment? No     Aspirin use counseling: Taking ASA appropriately as indicated    Recent Lab Results:  CMP:  Lab Results   Component Value Date     (H) 10/03/2019    BUN 19 10/03/2019    CREATININE 1.03 (H) 10/03/2019    EGFRIFNONA 51 (L) 10/03/2019    EGFRIFAFRI 62 10/03/2019    BCR 18.4 10/03/2019     10/03/2019    K 4.5 10/03/2019    CO2 25.7 10/03/2019    CALCIUM 9.7 10/03/2019    PROTENTOTREF 7.1 10/03/2019    ALBUMIN 4.40 10/03/2019    LABGLOBREF 2.7 10/03/2019    LABIL2 1.6 10/03/2019    BILITOT 0.4 10/03/2019    ALKPHOS 69 10/03/2019    AST 24 10/03/2019    ALT 23 10/03/2019     Lipid Panel:  Lab Results   Component Value Date    CHLPL 174 10/03/2019     (H) 10/03/2019    LDL 77 11/05/2018    HDL 48 10/03/2019    HDL 51 11/05/2018    TRIG 123 10/03/2019    TRIG 72 11/05/2018     HbA1c:  Lab Results   Component Value Date    HGBA1C 5.50 11/05/2018       Visual Acuity:  No exam data present    Age-appropriate Screening Schedule:  Refer to the list below for future screening recommendations based on patient's age, sex and/or medical conditions. Orders for these recommended tests are listed in the plan section. The patient has been provided with a written plan.    Health Maintenance   Topic Date Due   • ZOSTER VACCINE (1 of 2) 06/08/1987   •  "PNEUMOCOCCAL VACCINES (65+ LOW/MEDIUM RISK) (2 of 2 - PPSV23) 04/28/2017   • LIPID PANEL  10/03/2020   • DXA SCAN  02/04/2021   • TDAP/TD VACCINES (2 - Td) 08/29/2022   • INFLUENZA VACCINE  Completed          The following portions of the patient's history were reviewed and updated as appropriate: allergies, current medications, past family history, past medical history, past social history, past surgical history and problem list.    Outpatient Medications Prior to Visit   Medication Sig Dispense Refill   • aspirin 81 MG chewable tablet Chew 81 mg Daily.     • atorvastatin (LIPITOR) 10 MG tablet Take 1 tablet by mouth Daily. 90 tablet 3   • cetirizine (zyrTEC) 10 MG tablet Take 10 mg by mouth Daily.     • Cholecalciferol (D 1000) 1000 units capsule Take 1,000 Units by mouth Daily.     • ferrous sulfate (IRON SUPPLEMENT) 325 (65 FE) MG tablet Take 1 tablet by mouth Daily With Breakfast. 90 tablet 1     No facility-administered medications prior to visit.        Patient Active Problem List   Diagnosis   • Closed displaced fracture of left femoral neck (CMS/HCC)   • Hyperlipidemia   • Osteoporosis       Advance Care Planning:  Patient does not have an advance directive - information provided to the patient today    Identification of Risk Factors:  Risk factors include: no current    Review of Systems  I have reviewed the ROS as documented by the MA. Thuy Garza MD      Compared to one year ago, the patient feels her physical health is better and her mental health is better.    Objective     Vitals:    10/16/19 1521   Weight: 58.5 kg (129 lb)   Height: 162.6 cm (64\")     Physical Exam    Patient's Body mass index is 22.14 kg/m². BMI is within normal parameters. No follow-up required..      Assessment/Plan   Patient Self-Management and Personalized Health Advice  The patient has been provided with information about:  anderson currently    Visit Diagnoses:  Problem List Items Addressed This Visit        Cardiovascular " and Mediastinum    Hyperlipidemia - Primary    Relevant Orders    Comprehensive Metabolic Panel    Lipid Panel With LDL / HDL Ratio       Musculoskeletal and Integument    Osteoporosis      Other Visit Diagnoses     Iron deficiency        can d/c iron and we will follow    Relevant Orders    CBC & Differential    Ferritin    Iron Profile    Medicare annual wellness visit, subsequent        could get Shingrix at pharmacy- otherwise continue healthy, active lifestyle.  Recheck in 6 months or sooner as needed.           Orders Placed This Encounter   Procedures   • Comprehensive Metabolic Panel     Standing Status:   Future     Standing Expiration Date:   10/16/2020   • Lipid Panel With LDL / HDL Ratio     Standing Status:   Future     Standing Expiration Date:   10/16/2020   • Ferritin   • Iron Profile   • CBC & Differential     Order Specific Question:   Manual Differential     Answer:   No       Outpatient Encounter Medications as of 10/16/2019   Medication Sig Dispense Refill   • aspirin 81 MG chewable tablet Chew 81 mg Daily.     • atorvastatin (LIPITOR) 10 MG tablet Take 1 tablet by mouth Daily. 90 tablet 3   • cetirizine (zyrTEC) 10 MG tablet Take 10 mg by mouth Daily.     • Cholecalciferol (D 1000) 1000 units capsule Take 1,000 Units by mouth Daily.     • ferrous sulfate (IRON SUPPLEMENT) 325 (65 FE) MG tablet Take 1 tablet by mouth Daily With Breakfast. 90 tablet 1     No facility-administered encounter medications on file as of 10/16/2019.        Current medication list contains no high risk medications.  No harmful drug interactions have been identified.   Check the visit checklist  Follow Up:  Return in about 6 months (around 4/16/2020) for Recheck.     An After Visit Summary and PPPS with all of these plans were given to the patient.

## 2019-11-06 DIAGNOSIS — M81.0 AGE-RELATED OSTEOPOROSIS WITHOUT CURRENT PATHOLOGICAL FRACTURE: Primary | ICD-10-CM

## 2019-11-11 DIAGNOSIS — M81.0 AGE-RELATED OSTEOPOROSIS WITHOUT CURRENT PATHOLOGICAL FRACTURE: Primary | ICD-10-CM

## 2019-12-03 ENCOUNTER — INFUSION (OUTPATIENT)
Dept: ONCOLOGY | Facility: HOSPITAL | Age: 84
End: 2019-12-03

## 2019-12-03 ENCOUNTER — LAB (OUTPATIENT)
Dept: OTHER | Facility: HOSPITAL | Age: 84
End: 2019-12-03

## 2019-12-03 VITALS
SYSTOLIC BLOOD PRESSURE: 133 MMHG | DIASTOLIC BLOOD PRESSURE: 67 MMHG | TEMPERATURE: 97.9 F | WEIGHT: 131.6 LBS | BODY MASS INDEX: 22.59 KG/M2 | OXYGEN SATURATION: 95 % | HEART RATE: 70 BPM

## 2019-12-03 DIAGNOSIS — M81.0 AGE-RELATED OSTEOPOROSIS WITHOUT CURRENT PATHOLOGICAL FRACTURE: ICD-10-CM

## 2019-12-03 DIAGNOSIS — M81.0 AGE-RELATED OSTEOPOROSIS WITHOUT CURRENT PATHOLOGICAL FRACTURE: Primary | ICD-10-CM

## 2019-12-03 LAB
ANION GAP SERPL CALCULATED.3IONS-SCNC: 9.5 MMOL/L (ref 5–15)
BUN BLD-MCNC: 22 MG/DL (ref 8–23)
BUN/CREAT SERPL: 20.4 (ref 7–25)
CALCIUM SPEC-SCNC: 9.6 MG/DL (ref 8.6–10.5)
CHLORIDE SERPL-SCNC: 106 MMOL/L (ref 98–107)
CO2 SERPL-SCNC: 27.5 MMOL/L (ref 22–29)
CREAT BLD-MCNC: 1.08 MG/DL (ref 0.57–1)
GFR SERPL CREATININE-BSD FRML MDRD: 49 ML/MIN/1.73
GLUCOSE BLD-MCNC: 101 MG/DL (ref 65–99)
POTASSIUM BLD-SCNC: 4.4 MMOL/L (ref 3.5–5.2)
SODIUM BLD-SCNC: 143 MMOL/L (ref 136–145)

## 2019-12-03 PROCEDURE — 96372 THER/PROPH/DIAG INJ SC/IM: CPT | Performed by: NURSE PRACTITIONER

## 2019-12-03 PROCEDURE — 80048 BASIC METABOLIC PNL TOTAL CA: CPT | Performed by: INTERNAL MEDICINE

## 2019-12-03 PROCEDURE — 25010000002 DENOSUMAB 60 MG/ML SOLUTION PREFILLED SYRINGE: Performed by: INTERNAL MEDICINE

## 2019-12-03 PROCEDURE — 36415 COLL VENOUS BLD VENIPUNCTURE: CPT

## 2019-12-03 RX ADMIN — DENOSUMAB 60 MG: 60 INJECTION SUBCUTANEOUS at 14:21

## 2019-12-23 RX ORDER — CETIRIZINE HYDROCHLORIDE 10 MG/1
10 TABLET ORAL DAILY
Qty: 90 TABLET | Refills: 1 | Status: SHIPPED | OUTPATIENT
Start: 2019-12-23 | End: 2020-03-23 | Stop reason: SDUPTHER

## 2020-01-07 ENCOUNTER — TELEPHONE (OUTPATIENT)
Dept: INTERNAL MEDICINE | Facility: CLINIC | Age: 85
End: 2020-01-07

## 2020-01-07 RX ORDER — AZITHROMYCIN 250 MG/1
TABLET, FILM COATED ORAL
Qty: 6 TABLET | Refills: 0 | Status: SHIPPED | OUTPATIENT
Start: 2020-01-07 | End: 2020-07-31

## 2020-01-07 NOTE — TELEPHONE ENCOUNTER
Dr. CUNNINGHAM PT called states she has her usual sinus infection and would like to know if you will call something in for her? Or so you need to see her?    PT # [phone]

## 2020-02-13 ENCOUNTER — RESULTS ENCOUNTER (OUTPATIENT)
Dept: INTERNAL MEDICINE | Facility: CLINIC | Age: 85
End: 2020-02-13

## 2020-02-13 ENCOUNTER — TELEPHONE (OUTPATIENT)
Dept: INTERNAL MEDICINE | Facility: CLINIC | Age: 85
End: 2020-02-13

## 2020-02-13 DIAGNOSIS — E78.5 HYPERLIPIDEMIA, UNSPECIFIED HYPERLIPIDEMIA TYPE: ICD-10-CM

## 2020-02-13 NOTE — TELEPHONE ENCOUNTER
Dr. CUNNINGHAM PT called she got a notice from [facility] that it was time for her mammogram, before she make her appt she wants to know if she need to do these?    PT # [phone]

## 2020-03-23 RX ORDER — ATORVASTATIN CALCIUM 10 MG/1
10 TABLET, FILM COATED ORAL DAILY
Qty: 90 TABLET | Refills: 3 | Status: SHIPPED | OUTPATIENT
Start: 2020-03-23 | End: 2020-09-17

## 2020-03-23 RX ORDER — CETIRIZINE HYDROCHLORIDE 10 MG/1
10 TABLET ORAL DAILY
Qty: 90 TABLET | Refills: 1 | Status: SHIPPED | OUTPATIENT
Start: 2020-03-23 | End: 2020-07-06

## 2020-04-07 ENCOUNTER — TELEPHONE (OUTPATIENT)
Dept: INTERNAL MEDICINE | Facility: CLINIC | Age: 85
End: 2020-04-07

## 2020-04-13 ENCOUNTER — RESULTS ENCOUNTER (OUTPATIENT)
Dept: INTERNAL MEDICINE | Facility: CLINIC | Age: 85
End: 2020-04-13

## 2020-04-13 DIAGNOSIS — E78.5 HYPERLIPIDEMIA, UNSPECIFIED HYPERLIPIDEMIA TYPE: ICD-10-CM

## 2020-07-06 ENCOUNTER — TELEPHONE (OUTPATIENT)
Dept: INTERNAL MEDICINE | Facility: CLINIC | Age: 85
End: 2020-07-06

## 2020-07-06 RX ORDER — CETIRIZINE HYDROCHLORIDE 10 MG/1
10 TABLET ORAL DAILY
Qty: 90 TABLET | Refills: 1 | Status: SHIPPED | OUTPATIENT
Start: 2020-07-06 | End: 2021-01-11

## 2020-07-06 NOTE — TELEPHONE ENCOUNTER
Yes, she can get blood work done for appt and it will be good. For this too.  Looks like we cancelled her April appt without rescheduling- please do around this appt.

## 2020-07-06 NOTE — TELEPHONE ENCOUNTER
Patient is not sure if she still needs to have Prolia and labs done before Prolia, please call patient at [phone]

## 2020-07-22 ENCOUNTER — TELEPHONE (OUTPATIENT)
Dept: INTERNAL MEDICINE | Facility: CLINIC | Age: 85
End: 2020-07-22

## 2020-07-22 DIAGNOSIS — M81.0 AGE-RELATED OSTEOPOROSIS WITHOUT CURRENT PATHOLOGICAL FRACTURE: Primary | ICD-10-CM

## 2020-07-22 NOTE — TELEPHONE ENCOUNTER
Patient states she is due for her Prolia injection and also needing labs, please advise. [phone]

## 2020-07-27 ENCOUNTER — RESULTS ENCOUNTER (OUTPATIENT)
Dept: INTERNAL MEDICINE | Facility: CLINIC | Age: 85
End: 2020-07-27

## 2020-07-27 DIAGNOSIS — M81.0 AGE-RELATED OSTEOPOROSIS WITHOUT CURRENT PATHOLOGICAL FRACTURE: ICD-10-CM

## 2020-07-31 ENCOUNTER — INFUSION (OUTPATIENT)
Dept: ONCOLOGY | Facility: HOSPITAL | Age: 85
End: 2020-07-31

## 2020-07-31 ENCOUNTER — LAB (OUTPATIENT)
Dept: OTHER | Facility: HOSPITAL | Age: 85
End: 2020-07-31

## 2020-07-31 VITALS
OXYGEN SATURATION: 95 % | SYSTOLIC BLOOD PRESSURE: 124 MMHG | RESPIRATION RATE: 18 BRPM | WEIGHT: 133.8 LBS | BODY MASS INDEX: 23.71 KG/M2 | HEIGHT: 63 IN | DIASTOLIC BLOOD PRESSURE: 77 MMHG | HEART RATE: 71 BPM | TEMPERATURE: 97.7 F

## 2020-07-31 DIAGNOSIS — E61.1 IRON DEFICIENCY: ICD-10-CM

## 2020-07-31 DIAGNOSIS — M81.0 AGE-RELATED OSTEOPOROSIS WITHOUT CURRENT PATHOLOGICAL FRACTURE: Primary | ICD-10-CM

## 2020-07-31 DIAGNOSIS — E78.5 HYPERLIPIDEMIA, UNSPECIFIED HYPERLIPIDEMIA TYPE: ICD-10-CM

## 2020-07-31 LAB
ALBUMIN SERPL-MCNC: 4.2 G/DL (ref 3.5–5.2)
ALBUMIN/GLOB SERPL: 1.7 G/DL
ALP SERPL-CCNC: 55 U/L (ref 39–117)
ALT SERPL W P-5'-P-CCNC: 32 U/L (ref 1–33)
ANION GAP SERPL CALCULATED.3IONS-SCNC: 9.3 MMOL/L (ref 5–15)
AST SERPL-CCNC: 26 U/L (ref 1–32)
BASOPHILS # BLD AUTO: 0.04 10*3/MM3 (ref 0–0.2)
BASOPHILS NFR BLD AUTO: 0.6 % (ref 0–1.5)
BILIRUB SERPL-MCNC: 0.3 MG/DL (ref 0–1.2)
BUN SERPL-MCNC: 17 MG/DL (ref 8–23)
BUN/CREAT SERPL: 18.3 (ref 7–25)
CALCIUM SPEC-SCNC: 9 MG/DL (ref 8.6–10.5)
CHLORIDE SERPL-SCNC: 106 MMOL/L (ref 98–107)
CO2 SERPL-SCNC: 24.7 MMOL/L (ref 22–29)
CREAT SERPL-MCNC: 0.93 MG/DL (ref 0.57–1)
DEPRECATED RDW RBC AUTO: 46.5 FL (ref 37–54)
EOSINOPHIL # BLD AUTO: 0.21 10*3/MM3 (ref 0–0.4)
EOSINOPHIL NFR BLD AUTO: 3.2 % (ref 0.3–6.2)
ERYTHROCYTE [DISTWIDTH] IN BLOOD BY AUTOMATED COUNT: 13.5 % (ref 12.3–15.4)
GFR SERPL CREATININE-BSD FRML MDRD: 58 ML/MIN/1.73
GLOBULIN UR ELPH-MCNC: 2.5 GM/DL
GLUCOSE SERPL-MCNC: 118 MG/DL (ref 65–99)
HCT VFR BLD AUTO: 40.3 % (ref 34–46.6)
HGB BLD-MCNC: 13.2 G/DL (ref 12–15.9)
IMM GRANULOCYTES # BLD AUTO: 0.02 10*3/MM3 (ref 0–0.05)
IMM GRANULOCYTES NFR BLD AUTO: 0.3 % (ref 0–0.5)
LYMPHOCYTES # BLD AUTO: 2.27 10*3/MM3 (ref 0.7–3.1)
LYMPHOCYTES NFR BLD AUTO: 35 % (ref 19.6–45.3)
MCH RBC QN AUTO: 30.3 PG (ref 26.6–33)
MCHC RBC AUTO-ENTMCNC: 32.8 G/DL (ref 31.5–35.7)
MCV RBC AUTO: 92.4 FL (ref 79–97)
MONOCYTES # BLD AUTO: 0.58 10*3/MM3 (ref 0.1–0.9)
MONOCYTES NFR BLD AUTO: 9 % (ref 5–12)
NEUTROPHILS NFR BLD AUTO: 3.36 10*3/MM3 (ref 1.7–7)
NEUTROPHILS NFR BLD AUTO: 51.9 % (ref 42.7–76)
NRBC BLD AUTO-RTO: 0 /100 WBC (ref 0–0.2)
PLATELET # BLD AUTO: 260 10*3/MM3 (ref 140–450)
PMV BLD AUTO: 9.1 FL (ref 6–12)
POTASSIUM SERPL-SCNC: 3.7 MMOL/L (ref 3.5–5.2)
PROT SERPL-MCNC: 6.7 G/DL (ref 6–8.5)
RBC # BLD AUTO: 4.36 10*6/MM3 (ref 3.77–5.28)
SODIUM SERPL-SCNC: 140 MMOL/L (ref 136–145)
WBC # BLD AUTO: 6.48 10*3/MM3 (ref 3.4–10.8)

## 2020-07-31 PROCEDURE — 83540 ASSAY OF IRON: CPT | Performed by: INTERNAL MEDICINE

## 2020-07-31 PROCEDURE — 96372 THER/PROPH/DIAG INJ SC/IM: CPT

## 2020-07-31 PROCEDURE — 85025 COMPLETE CBC W/AUTO DIFF WBC: CPT | Performed by: INTERNAL MEDICINE

## 2020-07-31 PROCEDURE — 80061 LIPID PANEL: CPT | Performed by: INTERNAL MEDICINE

## 2020-07-31 PROCEDURE — 80053 COMPREHEN METABOLIC PANEL: CPT | Performed by: INTERNAL MEDICINE

## 2020-07-31 PROCEDURE — 82728 ASSAY OF FERRITIN: CPT | Performed by: INTERNAL MEDICINE

## 2020-07-31 PROCEDURE — 25010000002 DENOSUMAB 60 MG/ML SOLUTION PREFILLED SYRINGE: Performed by: INTERNAL MEDICINE

## 2020-07-31 PROCEDURE — 36415 COLL VENOUS BLD VENIPUNCTURE: CPT | Performed by: INTERNAL MEDICINE

## 2020-07-31 PROCEDURE — 84466 ASSAY OF TRANSFERRIN: CPT | Performed by: INTERNAL MEDICINE

## 2020-07-31 RX ADMIN — DENOSUMAB 60 MG: 60 INJECTION SUBCUTANEOUS at 15:29

## 2020-07-31 NOTE — NURSING NOTE
Arrived  for prolia injection. Indication and side effects reviewed. Denies recent dental work. Labs and medications verified. Prolia administered in  L arm without incidence. Instructed to call prescribing MD for any concerns or questions and instructed on how to schedule future appts.  Pt vu and discharged ambulatory.

## 2020-08-02 LAB
CHOLEST SERPL-MCNC: 180 MG/DL (ref 100–199)
FERRITIN SERPL-MCNC: 40 NG/ML (ref 15–150)
HDLC SERPL-MCNC: 38 MG/DL
LDLC SERPL CALC-MCNC: 92 MG/DL (ref 0–99)
LDLC/HDLC SERPL: 2.4 RATIO (ref 0–3.2)
TRIGL SERPL-MCNC: 252 MG/DL (ref 0–149)
VLDLC SERPL-MCNC: 50 MG/DL (ref 5–40)

## 2020-08-05 LAB
IRON 24H UR-MRATE: 89 MCG/DL (ref 37–145)
IRON SATN MFR SERPL: 26 % (ref 20–50)
TIBC SERPL-MCNC: 347 MCG/DL (ref 298–536)
TRANSFERRIN SERPL-MCNC: 233 MG/DL (ref 200–360)

## 2020-09-17 RX ORDER — ATORVASTATIN CALCIUM 10 MG/1
10 TABLET, FILM COATED ORAL DAILY
Qty: 90 TABLET | Refills: 3 | Status: SHIPPED | OUTPATIENT
Start: 2020-09-17 | End: 2021-09-23

## 2021-01-11 RX ORDER — CETIRIZINE HYDROCHLORIDE 10 MG/1
10 TABLET ORAL DAILY
Qty: 90 TABLET | Refills: 1 | Status: SHIPPED | OUTPATIENT
Start: 2021-01-11 | End: 2021-09-23

## 2021-01-30 DIAGNOSIS — M81.0 AGE-RELATED OSTEOPOROSIS WITHOUT CURRENT PATHOLOGICAL FRACTURE: Primary | ICD-10-CM

## 2021-02-01 ENCOUNTER — TELEPHONE (OUTPATIENT)
Dept: ONCOLOGY | Facility: HOSPITAL | Age: 86
End: 2021-02-01

## 2021-02-01 NOTE — TELEPHONE ENCOUNTER
Called patient to inform her of need for labs prior to her prolia.  Car registration info given and she v/u all instructions.

## 2021-02-03 ENCOUNTER — LAB (OUTPATIENT)
Dept: OTHER | Facility: HOSPITAL | Age: 86
End: 2021-02-03

## 2021-02-03 ENCOUNTER — INFUSION (OUTPATIENT)
Dept: ONCOLOGY | Facility: HOSPITAL | Age: 86
End: 2021-02-03

## 2021-02-03 VITALS — WEIGHT: 130.4 LBS | RESPIRATION RATE: 18 BRPM | HEIGHT: 63 IN | BODY MASS INDEX: 23.11 KG/M2

## 2021-02-03 DIAGNOSIS — M81.0 AGE-RELATED OSTEOPOROSIS WITHOUT CURRENT PATHOLOGICAL FRACTURE: Primary | ICD-10-CM

## 2021-02-03 PROCEDURE — 96372 THER/PROPH/DIAG INJ SC/IM: CPT

## 2021-02-03 PROCEDURE — 25010000002 DENOSUMAB 60 MG/ML SOLUTION PREFILLED SYRINGE: Performed by: INTERNAL MEDICINE

## 2021-02-03 PROCEDURE — 84100 ASSAY OF PHOSPHORUS: CPT | Performed by: INTERNAL MEDICINE

## 2021-02-03 PROCEDURE — 83735 ASSAY OF MAGNESIUM: CPT | Performed by: INTERNAL MEDICINE

## 2021-02-03 PROCEDURE — 80053 COMPREHEN METABOLIC PANEL: CPT | Performed by: INTERNAL MEDICINE

## 2021-02-03 RX ADMIN — DENOSUMAB 60 MG: 60 INJECTION SUBCUTANEOUS at 13:47

## 2021-04-07 ENCOUNTER — HOSPITAL ENCOUNTER (OUTPATIENT)
Dept: GENERAL RADIOLOGY | Facility: HOSPITAL | Age: 86
Discharge: HOME | End: 2021-04-07
Admitting: INTERNAL MEDICINE

## 2021-04-07 ENCOUNTER — TELEPHONE (OUTPATIENT)
Dept: INTERNAL MEDICINE | Facility: CLINIC | Age: 86
End: 2021-04-07

## 2021-04-07 ENCOUNTER — OFFICE VISIT (OUTPATIENT)
Dept: INTERNAL MEDICINE | Facility: CLINIC | Age: 86
End: 2021-04-07

## 2021-04-07 VITALS — HEIGHT: 63 IN | WEIGHT: 131 LBS | BODY MASS INDEX: 23.21 KG/M2 | TEMPERATURE: 97.3 F

## 2021-04-07 DIAGNOSIS — R19.4 BOWEL HABIT CHANGES: Primary | ICD-10-CM

## 2021-04-07 DIAGNOSIS — R19.4 BOWEL HABIT CHANGES: ICD-10-CM

## 2021-04-07 PROBLEM — E78.49 OTHER HYPERLIPIDEMIA: Status: ACTIVE | Noted: 2019-05-09

## 2021-04-07 PROCEDURE — 99213 OFFICE O/P EST LOW 20 MIN: CPT | Performed by: INTERNAL MEDICINE

## 2021-04-07 PROCEDURE — 74018 RADEX ABDOMEN 1 VIEW: CPT

## 2021-04-07 RX ORDER — MECLIZINE HYDROCHLORIDE 25 MG/1
25 TABLET ORAL 3 TIMES DAILY PRN
Qty: 30 TABLET | Refills: 0 | Status: SHIPPED | OUTPATIENT
Start: 2021-04-07 | End: 2021-11-01

## 2021-04-07 NOTE — PROGRESS NOTES
"Chief Complaint  GI Problem    Subjective          Tereso Allan presents to [facility]  History of Present Illness  Having some bowel issues- will have normal BMs then go several days of diarrhea (with frequency) then she'll have a few days of seeping a light brown fluid with no formed stools.  This cycle repeats over and over- for several months.  She feels fine, has a lot of gas, even just walking.  Weight and appetite is good.  Denies taking antidiarrheal or stool softeners/laxatives.   She has no pain.  Last c-scope about 5 years ago per Dr. Mcguire which was totally normal.  Rarely skips days and never more than 2 days in a row.  Has a history of issues with dizziness when getting off a plane- she has tried once to sip club soda on the descent and it fixed the problem.  She would like to have some meclizine to take with her on up coming trip.    Objective   Vital Signs:   Temp 97.3 °F (36.3 °C)   Ht 160 cm (63\")   Wt 59.4 kg (131 lb)   BMI 23.21 kg/m²     Physical Exam  Constitutional:       Appearance: Normal appearance.   Eyes:      Conjunctiva/sclera: Conjunctivae normal.   Abdominal:      General: Abdomen is flat. Bowel sounds are normal.      Palpations: Abdomen is soft.   Genitourinary:     Rectum: External hemorrhoid present. No mass or tenderness. Normal anal tone.      Comments: Soft stool high in vault       Result Review :                 Assessment and Plan    Diagnoses and all orders for this visit:    1. Bowel habit changes (Primary)  Comments:  check KUB  and add daily fiber.  Recheck 3 weeks.  Orders:  -     XR Abdomen KUB; Future    Other orders  -     meclizine (ANTIVERT) 25 MG tablet; Take 1 tablet by mouth 3 (Three) Times a Day As Needed for Dizziness.  Dispense: 30 tablet; Refill: 0        Follow Up   Return in about 3 years (around 4/7/2024).  Patient was given instructions and counseling regarding her condition or for health maintenance advice. Please " see specific information pulled into the AVS if appropriate.

## 2021-04-07 NOTE — TELEPHONE ENCOUNTER
PATIENT CALLED STATING SHE DID NOT GET THE FIBER RX THAT SHE WAS TO RECEIVE WITH HER MECLIZINE.     [id number] - [place], [place] - [address] AT [facility] [address] - [phone]  - [phone]   [phone]    Tereso Allan (Self) [phone] (H)        PLEASE CALL AND ADVISE

## 2021-04-27 ENCOUNTER — OFFICE VISIT (OUTPATIENT)
Dept: INTERNAL MEDICINE | Facility: CLINIC | Age: 86
End: 2021-04-27

## 2021-04-27 VITALS — HEIGHT: 63 IN | WEIGHT: 131 LBS | BODY MASS INDEX: 23.21 KG/M2 | TEMPERATURE: 97.3 F

## 2021-04-27 DIAGNOSIS — R19.4 BOWEL HABIT CHANGES: Primary | ICD-10-CM

## 2021-04-27 DIAGNOSIS — E78.5 HYPERLIPIDEMIA, UNSPECIFIED HYPERLIPIDEMIA TYPE: ICD-10-CM

## 2021-04-27 PROCEDURE — 99213 OFFICE O/P EST LOW 20 MIN: CPT | Performed by: INTERNAL MEDICINE

## 2021-04-27 NOTE — PROGRESS NOTES
"Chief Complaint  GI Problem    Subjective          Tereso Allan presents to [facility]  History of Present Illness  Here to f/u bowel habit changes- we added fiber and checked KUB- initially, no BM and then small movements for 4 days.  Since, she has had a few days with nothing and then some days with smaller BM.  The seepage has resolved, which is the best thing.     Objective   Vital Signs:   Temp 97.3 °F (36.3 °C)   Ht 160 cm (63\")   Wt 59.4 kg (131 lb)   BMI 23.21 kg/m²     Physical Exam  Constitutional:       Appearance: Normal appearance.   Cardiovascular:      Rate and Rhythm: Normal rate.   Abdominal:      General: Abdomen is flat. Bowel sounds are normal.      Palpations: Abdomen is soft.        Result Review :                 Assessment and Plan    Diagnoses and all orders for this visit:    1. Bowel habit changes (Primary)  Comments:  improved with daily fiber- will continue same.  Stressed increased water intake.    2. Hyperlipidemia, unspecified hyperlipidemia type  -     Comprehensive Metabolic Panel; Future  -     Lipid Panel With / Chol / HDL Ratio; Future        Follow Up   Return in about 6 months (around 10/27/2021) for Medicare Wellness, Lab Before FUP.  Patient was given instructions and counseling regarding her condition or for health maintenance advice. Please see specific information pulled into the AVS if appropriate.       "

## 2021-08-31 ENCOUNTER — INFUSION (OUTPATIENT)
Dept: ONCOLOGY | Facility: HOSPITAL | Age: 86
End: 2021-08-31

## 2021-08-31 ENCOUNTER — LAB (OUTPATIENT)
Dept: OTHER | Facility: HOSPITAL | Age: 86
End: 2021-08-31

## 2021-08-31 VITALS — HEIGHT: 63 IN | TEMPERATURE: 96.8 F | RESPIRATION RATE: 18 BRPM | BODY MASS INDEX: 23.21 KG/M2

## 2021-08-31 DIAGNOSIS — M81.0 AGE-RELATED OSTEOPOROSIS WITHOUT CURRENT PATHOLOGICAL FRACTURE: Primary | ICD-10-CM

## 2021-08-31 PROCEDURE — 84100 ASSAY OF PHOSPHORUS: CPT | Performed by: INTERNAL MEDICINE

## 2021-08-31 PROCEDURE — 96372 THER/PROPH/DIAG INJ SC/IM: CPT

## 2021-08-31 PROCEDURE — 25010000002 DENOSUMAB 60 MG/ML SOLUTION PREFILLED SYRINGE: Performed by: INTERNAL MEDICINE

## 2021-08-31 PROCEDURE — 80053 COMPREHEN METABOLIC PANEL: CPT | Performed by: INTERNAL MEDICINE

## 2021-08-31 PROCEDURE — 83735 ASSAY OF MAGNESIUM: CPT | Performed by: INTERNAL MEDICINE

## 2021-08-31 RX ORDER — MULTIPLE VITAMINS W/ MINERALS TAB 9MG-400MCG
1 TAB ORAL DAILY
COMMUNITY

## 2021-08-31 RX ADMIN — DENOSUMAB 60 MG: 60 INJECTION SUBCUTANEOUS at 14:00

## 2021-09-23 RX ORDER — CETIRIZINE HYDROCHLORIDE 10 MG/1
TABLET ORAL
Qty: 90 TABLET | Refills: 1 | Status: SHIPPED | OUTPATIENT
Start: 2021-09-23 | End: 2022-03-29

## 2021-09-23 RX ORDER — ATORVASTATIN CALCIUM 10 MG/1
10 TABLET, FILM COATED ORAL DAILY
Qty: 90 TABLET | Refills: 3 | Status: SHIPPED | OUTPATIENT
Start: 2021-09-23 | End: 2022-09-19 | Stop reason: SDUPTHER

## 2021-11-01 ENCOUNTER — OFFICE VISIT (OUTPATIENT)
Dept: INTERNAL MEDICINE | Facility: CLINIC | Age: 86
End: 2021-11-01

## 2021-11-01 VITALS — HEIGHT: 63 IN | TEMPERATURE: 97.3 F | WEIGHT: 130 LBS | BODY MASS INDEX: 23.04 KG/M2

## 2021-11-01 DIAGNOSIS — E78.49 OTHER HYPERLIPIDEMIA: ICD-10-CM

## 2021-11-01 DIAGNOSIS — M81.0 AGE-RELATED OSTEOPOROSIS WITHOUT CURRENT PATHOLOGICAL FRACTURE: Primary | ICD-10-CM

## 2021-11-01 DIAGNOSIS — Z00.00 MEDICARE ANNUAL WELLNESS VISIT, SUBSEQUENT: ICD-10-CM

## 2021-11-01 PROBLEM — S72.002A CLOSED DISPLACED FRACTURE OF LEFT FEMORAL NECK: Status: RESOLVED | Noted: 2018-11-03 | Resolved: 2021-11-01

## 2021-11-01 PROCEDURE — 1170F FXNL STATUS ASSESSED: CPT | Performed by: INTERNAL MEDICINE

## 2021-11-01 PROCEDURE — 1159F MED LIST DOCD IN RCRD: CPT | Performed by: INTERNAL MEDICINE

## 2021-11-01 PROCEDURE — G0439 PPPS, SUBSEQ VISIT: HCPCS | Performed by: INTERNAL MEDICINE

## 2021-11-01 NOTE — PROGRESS NOTES
The ABCs of the Annual Wellness Visit  Subsequent Medicare Wellness Visit    Chief Complaint   Patient presents with   • Medicare Wellness-subsequent      Subjective    History of Present Illness:  Tereso Allan is a 86 y.o. female who presents for a Subsequent Medicare Wellness Visit.  Doing well with Prolia inj- due for f/u Dexa.   She feels great and is happy with her life currently.    The following portions of the patient's history were reviewed and   updated as appropriate: allergies, current medications, past family history, past medical history, past social history, past surgical history and problem list.    Compared to one year ago, the patient feels her physical   health is the same.    Compared to one year ago, the patient feels her mental   health is the same.    Recent Hospitalizations:  She was not admitted to the hospital during the last year.       Current Medical Providers:  Patient Care Team:  Thuy Garza MD as PCP - General (Internal Medicine)  Thuy Garza MD as Referring Physician (Internal Medicine)    Outpatient Medications Prior to Visit   Medication Sig Dispense Refill   • aspirin 81 MG chewable tablet Chew 81 mg Daily.     • atorvastatin (LIPITOR) 10 MG tablet TAKE 1 TABLET BY MOUTH DAILY 90 tablet 3   • cetirizine (zyrTEC) 10 MG tablet TAKE 1 TABLET BY MOUTH EVERY DAY 90 tablet 1   • Cholecalciferol (D 1000) 1000 units capsule Take 1,000 Units by mouth Daily.     • multivitamin with minerals tablet tablet Take 1 tablet by mouth Daily.     • meclizine (ANTIVERT) 25 MG tablet Take 1 tablet by mouth 3 (Three) Times a Day As Needed for Dizziness. 30 tablet 0     No facility-administered medications prior to visit.       No opioid medication identified on active medication list. I have reviewed chart for other potential  high risk medication/s and harmful drug interactions in the elderly.          Aspirin is on active medication list. Aspirin use is not indicated based on review of  "current medical condition/s. Risk of harm outweighs potential benefits. Patient instructed to discontinue this medication.  .      Patient Active Problem List   Diagnosis   • Other hyperlipidemia   • Osteoporosis     Advance Care Planning  Advance Directive is not on file.  ACP discussion was held with the patient during this visit. Patient does not have an advance directive, declines further assistance.    Review of Systems   Constitutional: Negative for activity change.   HENT: Negative for congestion and trouble swallowing.    Eyes: Negative for visual disturbance.   Respiratory: Negative for cough and shortness of breath.    Cardiovascular: Negative for chest pain and leg swelling.   Gastrointestinal: Negative for abdominal pain.   Endocrine: Negative for polyuria.   Genitourinary: Negative for difficulty urinating.   Musculoskeletal: Negative for arthralgias, back pain and gait problem.   Neurological: Negative for dizziness and numbness.   Psychiatric/Behavioral: Negative for dysphoric mood.        Objective    Vitals:    11/01/21 1334   Temp: 97.3 °F (36.3 °C)   Weight: 59 kg (130 lb)   Height: 160 cm (63\")     BMI Readings from Last 1 Encounters:   11/01/21 23.03 kg/m²   BMI is within normal parameters. No follow-up required.    Does the patient have evidence of cognitive impairment? No    Physical Exam  Constitutional:       Appearance: Normal appearance.   Cardiovascular:      Rate and Rhythm: Normal rate and regular rhythm.   Pulmonary:      Effort: Pulmonary effort is normal.   Chest:   Breasts:      Right: Normal.      Left: Normal.       Musculoskeletal:      Right lower leg: No edema.      Left lower leg: No edema.   Lymphadenopathy:      Cervical: No cervical adenopathy.       Lab Results   Component Value Date    CHLPL 190 10/25/2021    TRIG 174 (H) 10/25/2021    HDL 41 10/25/2021     (H) 10/25/2021    VLDL 31 10/25/2021            HEALTH RISK ASSESSMENT    Smoking Status:  Social History "     Tobacco Use   Smoking Status Never Smoker   Smokeless Tobacco Never Used     Alcohol Consumption:  Social History     Substance and Sexual Activity   Alcohol Use No     Fall Risk Screen:    STEADI Fall Risk Assessment was completed, and patient is at LOW risk for falls.Assessment completed on:11/1/2021    Depression Screening:  PHQ-2/PHQ-9 Depression Screening 11/1/2021   Little interest or pleasure in doing things 0   Feeling down, depressed, or hopeless 0   Trouble falling or staying asleep, or sleeping too much -   Feeling tired or having little energy -   Poor appetite or overeating -   Feeling bad about yourself - or that you are a failure or have let yourself or your family down -   Trouble concentrating on things, such as reading the newspaper or watching television -   Moving or speaking so slowly that other people could have noticed. Or the opposite - being so fidgety or restless that you have been moving around a lot more than usual -   Thoughts that you would be better off dead, or of hurting yourself in some way -   Total Score 0       Health Habits and Functional and Cognitive Screening:  Functional & Cognitive Status 11/1/2021   Do you have difficulty preparing food and eating? No   Do you have difficulty bathing yourself, getting dressed or grooming yourself? No   Do you have difficulty using the toilet? No   Do you have difficulty moving around from place to place? No   Do you have trouble with steps or getting out of a bed or a chair? No   Current Diet Well Balanced Diet   Dental Exam Up to date   Eye Exam Up to date   Exercise (times per week) 0 times per week   Current Exercises Include No Regular Exercise   Current Exercise Activities Include -   Do you need help using the phone?  No   Are you deaf or do you have serious difficulty hearing?  No   Do you need help with transportation? No   Do you need help shopping? No   Do you need help preparing meals?  No   Do you need help with housework?   No   Do you need help with laundry? No   Do you need help taking your medications? No   Do you need help managing money? No   Do you ever drive or ride in a car without wearing a seat belt? No   Have you felt unusual stress, anger or loneliness in the last month? No   Who do you live with? Alone   If you need help, do you have trouble finding someone available to you? No   Have you been bothered in the last four weeks by sexual problems? No   Do you have difficulty concentrating, remembering or making decisions? No       Age-appropriate Screening Schedule:  Refer to the list below for future screening recommendations based on patient's age, sex and/or medical conditions. Orders for these recommended tests are listed in the plan section. The patient has been provided with a written plan.    Health Maintenance   Topic Date Due   • DXA SCAN  02/04/2021   • TDAP/TD VACCINES (2 - Td or Tdap) 08/29/2022   • LIPID PANEL  10/25/2022   • INFLUENZA VACCINE  Completed              Assessment/Plan   CMS Preventative Services Quick Reference  Risk Factors Identified During Encounter  Immunizations Discussed/Encouraged (specific Immunizations; Pneumococcal 23  The above risks/problems have been discussed with the patient.  Follow up actions/plans if indicated are seen below in the Assessment/Plan Section.  Pertinent information has been shared with the patient in the After Visit Summary.    Diagnoses and all orders for this visit:    1. Age-related osteoporosis without current pathological fracture (Primary)  Comments:  due for repeat Dexa- ordered at [facility]  Orders:  -     DEXA Bone Density Axial; Future    2. Other hyperlipidemia  Comments:  at goal    3. Medicare annual wellness visit, subsequent  Comments:  Doing great- no changes made. Encouraged cotinued activity level, social interactions, etc.  Recheck 6 m        Follow Up:   Return in about 6 months (around 5/1/2022) for Recheck, Lab Before FUP.     An After Visit  Summary and PPPS were made available to the patient.

## 2021-11-17 ENCOUNTER — TELEPHONE (OUTPATIENT)
Dept: INTERNAL MEDICINE | Facility: CLINIC | Age: 86
End: 2021-11-17

## 2021-11-18 ENCOUNTER — TELEPHONE (OUTPATIENT)
Dept: INTERNAL MEDICINE | Facility: CLINIC | Age: 86
End: 2021-11-18

## 2021-11-18 NOTE — TELEPHONE ENCOUNTER
Caller: Tereso Allan    Relationship: Self    Best call back number: [phone]     What is the best time to reach you: ANYTIME BEFORE 11 OR AFTER 1:30    Who are you requesting to speak with (clinical staff, provider,  specific staff member): CLINICAL     Do you know the name of the person who called:JERED YORK    What was the call regarding: PATIENT RETURNING CALL SHE BELIEVES IT WAS FOR HER BONE DENSITY RESULTS     Do you require a callback: YES

## 2021-11-19 ENCOUNTER — TELEPHONE (OUTPATIENT)
Dept: INTERNAL MEDICINE | Facility: CLINIC | Age: 86
End: 2021-11-19

## 2021-11-19 NOTE — TELEPHONE ENCOUNTER
Caller: Tereso Allan    Relationship: Self    Best call back number: [phone]    What is the medical concern/diagnosis: OSTEOPOROSIS    What is the provider, practice or medical service name: DR PRINCESS MONTES    Any additional details: PATIENT STATES SHE ALREADY SEES THIS DR AND WOULD LIKE TO SEE HIM FOR HER OSTEOPOROSIS REFERRAL AS WELL.

## 2021-11-22 DIAGNOSIS — M81.0 AGE-RELATED OSTEOPOROSIS WITHOUT CURRENT PATHOLOGICAL FRACTURE: Primary | ICD-10-CM

## 2022-02-26 DIAGNOSIS — M81.0 AGE-RELATED OSTEOPOROSIS WITHOUT CURRENT PATHOLOGICAL FRACTURE: Primary | ICD-10-CM

## 2022-03-03 ENCOUNTER — LAB (OUTPATIENT)
Dept: OTHER | Facility: HOSPITAL | Age: 87
End: 2022-03-03

## 2022-03-03 ENCOUNTER — INFUSION (OUTPATIENT)
Dept: ONCOLOGY | Facility: HOSPITAL | Age: 87
End: 2022-03-03

## 2022-03-03 VITALS — RESPIRATION RATE: 18 BRPM | HEIGHT: 62 IN | BODY MASS INDEX: 23.59 KG/M2 | TEMPERATURE: 97.8 F

## 2022-03-03 DIAGNOSIS — M81.0 AGE-RELATED OSTEOPOROSIS WITHOUT CURRENT PATHOLOGICAL FRACTURE: ICD-10-CM

## 2022-03-03 DIAGNOSIS — M81.0 AGE-RELATED OSTEOPOROSIS WITHOUT CURRENT PATHOLOGICAL FRACTURE: Primary | ICD-10-CM

## 2022-03-03 LAB
ALBUMIN SERPL-MCNC: 4.4 G/DL (ref 3.5–5.2)
ALBUMIN/GLOB SERPL: 1.4 G/DL
ALP SERPL-CCNC: 70 U/L (ref 39–117)
ALT SERPL W P-5'-P-CCNC: 24 U/L (ref 1–33)
ANION GAP SERPL CALCULATED.3IONS-SCNC: 10.3 MMOL/L (ref 5–15)
AST SERPL-CCNC: 24 U/L (ref 1–32)
BILIRUB SERPL-MCNC: 0.5 MG/DL (ref 0–1.2)
BUN SERPL-MCNC: 17 MG/DL (ref 8–23)
BUN/CREAT SERPL: 14.2 (ref 7–25)
CALCIUM SPEC-SCNC: 10.1 MG/DL (ref 8.6–10.5)
CHLORIDE SERPL-SCNC: 102 MMOL/L (ref 98–107)
CO2 SERPL-SCNC: 26.7 MMOL/L (ref 22–29)
CREAT SERPL-MCNC: 1.2 MG/DL (ref 0.57–1)
EGFRCR SERPLBLD CKD-EPI 2021: 44.7 ML/MIN/1.73
GLOBULIN UR ELPH-MCNC: 3.1 GM/DL
GLUCOSE SERPL-MCNC: 107 MG/DL (ref 65–99)
MAGNESIUM SERPL-MCNC: 2.2 MG/DL (ref 1.6–2.4)
PHOSPHATE SERPL-MCNC: 4.4 MG/DL (ref 2.5–4.5)
POTASSIUM SERPL-SCNC: 4.7 MMOL/L (ref 3.5–5.2)
PROT SERPL-MCNC: 7.5 G/DL (ref 6–8.5)
SODIUM SERPL-SCNC: 139 MMOL/L (ref 136–145)

## 2022-03-03 PROCEDURE — 96372 THER/PROPH/DIAG INJ SC/IM: CPT

## 2022-03-03 PROCEDURE — 25010000002 DENOSUMAB 60 MG/ML SOLUTION PREFILLED SYRINGE: Performed by: INTERNAL MEDICINE

## 2022-03-03 PROCEDURE — 84100 ASSAY OF PHOSPHORUS: CPT | Performed by: INTERNAL MEDICINE

## 2022-03-03 PROCEDURE — 80053 COMPREHEN METABOLIC PANEL: CPT | Performed by: INTERNAL MEDICINE

## 2022-03-03 PROCEDURE — 83735 ASSAY OF MAGNESIUM: CPT | Performed by: INTERNAL MEDICINE

## 2022-03-03 RX ADMIN — DENOSUMAB 60 MG: 60 INJECTION SUBCUTANEOUS at 13:24

## 2022-03-15 NOTE — BRIEF OP NOTE
HIP ENDOPROSTHESIS  Progress Note    Tereso Allan  11/3/2018 - 11/4/2018    Pre-op Diagnosis:   Left femoral neck fracture       Post-Op Diagnosis Codes:   Same    Procedure/CPT® Codes:      Procedure(s):  LEFT HIP BIPOLAR    Surgeon(s):  Yousif Lee MD    Anesthesia: General    Staff:   Circulator: Ellie Jimenez RN; Kina Lopez RN  Scrub Person: Alicia Morris; Ansley Knight  Assistant: Otoniel Stanley PA-C    Estimated Blood Loss: 200ml    Urine Voided: * No values recorded between 11/4/2018  2:20 PM and 11/4/2018  4:00 PM *    Specimens:                None      Drains:   Urethral Catheter Silicone 18 Fr. (Active)       Findings: Fracture    Complications: None      Yousif Lee MD     Date: 11/4/2018  Time: 4:03 PM       Consult received, EMR, labs, radiology reviewed./yes

## 2022-03-29 RX ORDER — CETIRIZINE HYDROCHLORIDE 10 MG/1
TABLET ORAL
Qty: 90 TABLET | Refills: 1 | Status: SHIPPED | OUTPATIENT
Start: 2022-03-29 | End: 2022-09-19 | Stop reason: SDUPTHER

## 2022-04-28 DIAGNOSIS — M81.0 AGE-RELATED OSTEOPOROSIS WITHOUT CURRENT PATHOLOGICAL FRACTURE: ICD-10-CM

## 2022-05-03 DIAGNOSIS — E87.5 HYPERKALEMIA: Primary | ICD-10-CM

## 2022-05-04 LAB
BUN SERPL-MCNC: 16 MG/DL (ref 8–27)
BUN/CREAT SERPL: 14 (ref 12–28)
CALCIUM SERPL-MCNC: 9.5 MG/DL (ref 8.7–10.3)
CHLORIDE SERPL-SCNC: 103 MMOL/L (ref 96–106)
CO2 SERPL-SCNC: 23 MMOL/L (ref 20–29)
CREAT SERPL-MCNC: 1.12 MG/DL (ref 0.57–1)
EGFRCR SERPLBLD CKD-EPI 2021: 48 ML/MIN/1.73
GLUCOSE SERPL-MCNC: 92 MG/DL (ref 65–99)
POTASSIUM SERPL-SCNC: 4.2 MMOL/L (ref 3.5–5.2)
SODIUM SERPL-SCNC: 143 MMOL/L (ref 134–144)

## 2022-05-09 ENCOUNTER — OFFICE VISIT (OUTPATIENT)
Dept: INTERNAL MEDICINE | Facility: CLINIC | Age: 87
End: 2022-05-09

## 2022-05-09 VITALS
TEMPERATURE: 97.3 F | BODY MASS INDEX: 22.82 KG/M2 | SYSTOLIC BLOOD PRESSURE: 128 MMHG | DIASTOLIC BLOOD PRESSURE: 66 MMHG | WEIGHT: 124 LBS | HEIGHT: 62 IN | HEART RATE: 74 BPM

## 2022-05-09 DIAGNOSIS — G47.62 NOCTURNAL LEG CRAMPS: ICD-10-CM

## 2022-05-09 DIAGNOSIS — E78.49 OTHER HYPERLIPIDEMIA: Primary | ICD-10-CM

## 2022-05-09 DIAGNOSIS — M81.0 AGE-RELATED OSTEOPOROSIS WITHOUT CURRENT PATHOLOGICAL FRACTURE: ICD-10-CM

## 2022-05-09 PROCEDURE — 99213 OFFICE O/P EST LOW 20 MIN: CPT | Performed by: INTERNAL MEDICINE

## 2022-05-09 NOTE — PROGRESS NOTES
"Chief Complaint  Hyperlipidemia    Subjective          Tereso Allan presents to [facility]  History of Present Illness No new problems- stays active with [facility].  Feels good.   Had to call her back for repeat potassium last week, was normal.  Having some leg cramps- mainly in toes.  Usually in the middle of the night- rarely happens in calves.     Objective   Vital Signs:  /66   Pulse 74   Temp 97.3 °F (36.3 °C)   Ht 158.1 cm (62.25\")   Wt 56.2 kg (124 lb)   BMI 22.50 kg/m²     BMI is within normal parameters. No follow-up required.      Physical Exam  Constitutional:       Appearance: Normal appearance.   Cardiovascular:      Rate and Rhythm: Normal rate and regular rhythm.   Pulmonary:      Effort: Pulmonary effort is normal.   Musculoskeletal:      Right lower leg: No edema.      Left lower leg: No edema.        Result Review :   The following data was reviewed by: Thuy Garza MD on 05/09/2022:  Common labs    Common Labsle 3/3/22 5/2/22 5/2/22 5/3/22     0954 0954    Glucose 107 (A) 100 (A)  92   BUN 17 15  16   Creatinine 1.20 (A) 1.11 (A)  1.12 (A)   Sodium 139 143  143   Potassium 4.7 5.5 (A)  4.2   Chloride 102 104  103   Calcium 10.1 10.1  9.5   Total Protein  7.1     Albumin 4.40 4.5     Total Bilirubin 0.5 0.4     Alkaline Phosphatase 70 59     AST (SGOT) 24 24     ALT (SGPT) 24 21     Total Cholesterol   189    Triglycerides   201 (A)    HDL Cholesterol   41    LDL Cholesterol    113 (A)    (A) Abnormal value                      Assessment and Plan    Diagnoses and all orders for this visit:    1. Other hyperlipidemia (Primary)  Comments:  at goal, no change.     2. Nocturnal leg cramps  Comments:  will try 1/2 benadryl at ng and watch what she ate for dinner to look for a pattern.     3. Age-related osteoporosis without current pathological fracture  Comments:  tolerating Prolia well.              Follow Up   Return in about 6 months (around " 11/9/2022) for Medicare Wellness, Lab Before FUP.  Patient was given instructions and counseling regarding her condition or for health maintenance advice. Please see specific information pulled into the AVS if appropriate.

## 2022-08-11 ENCOUNTER — TELEPHONE (OUTPATIENT)
Dept: INTERNAL MEDICINE | Facility: CLINIC | Age: 87
End: 2022-08-11

## 2022-08-11 NOTE — TELEPHONE ENCOUNTER
Caller: Tereso Allan    Relationship: Self    Best call back number: [phone]    What medication are you requesting:     What are your current symptoms: CONGESTION, COUGH     How long have you been experiencing symptoms: STARTED 8-10-22    Have you had these symptoms before:    [] Yes  [] No    Have you been treated for these symptoms before:   [] Yes  [] No    If a prescription is needed, what is your preferred pharmacy and phone number:    [id number] - [facility] [address] AT [facility] [place] - [phone] [facility] [phone] FX      Additional notes: TESTED POSITIVE FOR FLU 8-11-22. ASKING IF DR JOHNSON CAN SEND SOMETHING TO HELP HER GET OVER FLU BECAUSE HER CHILDREN ARE SCHEDULED TO COME IN THIS WEEKEND.  PLEASE ADVISE

## 2022-09-07 ENCOUNTER — INFUSION (OUTPATIENT)
Dept: ONCOLOGY | Facility: HOSPITAL | Age: 87
End: 2022-09-07

## 2022-09-07 ENCOUNTER — LAB (OUTPATIENT)
Dept: OTHER | Facility: HOSPITAL | Age: 87
End: 2022-09-07

## 2022-09-07 VITALS — TEMPERATURE: 97.5 F | RESPIRATION RATE: 16 BRPM

## 2022-09-07 DIAGNOSIS — M81.0 AGE-RELATED OSTEOPOROSIS WITHOUT CURRENT PATHOLOGICAL FRACTURE: Primary | ICD-10-CM

## 2022-09-07 LAB
ALBUMIN SERPL-MCNC: 4.2 G/DL (ref 3.5–5.2)
ALBUMIN/GLOB SERPL: 1.4 G/DL
ALP SERPL-CCNC: 60 U/L (ref 39–117)
ALT SERPL W P-5'-P-CCNC: 16 U/L (ref 1–33)
ANION GAP SERPL CALCULATED.3IONS-SCNC: 8 MMOL/L (ref 5–15)
AST SERPL-CCNC: 18 U/L (ref 1–32)
BILIRUB SERPL-MCNC: 0.5 MG/DL (ref 0–1.2)
BUN SERPL-MCNC: 14 MG/DL (ref 8–23)
BUN/CREAT SERPL: 12.7 (ref 7–25)
CALCIUM SPEC-SCNC: 9.6 MG/DL (ref 8.6–10.5)
CHLORIDE SERPL-SCNC: 103 MMOL/L (ref 98–107)
CO2 SERPL-SCNC: 29 MMOL/L (ref 22–29)
CREAT SERPL-MCNC: 1.1 MG/DL (ref 0.57–1)
EGFRCR SERPLBLD CKD-EPI 2021: 49.3 ML/MIN/1.73
GLOBULIN UR ELPH-MCNC: 3.1 GM/DL
GLUCOSE SERPL-MCNC: 95 MG/DL (ref 65–99)
MAGNESIUM SERPL-MCNC: 2.2 MG/DL (ref 1.6–2.4)
PHOSPHATE SERPL-MCNC: 3.5 MG/DL (ref 2.5–4.5)
POTASSIUM SERPL-SCNC: 4.4 MMOL/L (ref 3.5–5.2)
PROT SERPL-MCNC: 7.3 G/DL (ref 6–8.5)
SODIUM SERPL-SCNC: 140 MMOL/L (ref 136–145)

## 2022-09-07 PROCEDURE — 84100 ASSAY OF PHOSPHORUS: CPT | Performed by: INTERNAL MEDICINE

## 2022-09-07 PROCEDURE — 83735 ASSAY OF MAGNESIUM: CPT | Performed by: INTERNAL MEDICINE

## 2022-09-07 PROCEDURE — 96372 THER/PROPH/DIAG INJ SC/IM: CPT

## 2022-09-07 PROCEDURE — 80053 COMPREHEN METABOLIC PANEL: CPT | Performed by: INTERNAL MEDICINE

## 2022-09-07 PROCEDURE — 25010000002 DENOSUMAB 60 MG/ML SOLUTION PREFILLED SYRINGE: Performed by: INTERNAL MEDICINE

## 2022-09-07 RX ADMIN — DENOSUMAB 60 MG: 60 INJECTION SUBCUTANEOUS at 13:29

## 2022-09-07 NOTE — NURSING NOTE
Arrived  for prolia injection. Indication and side effects reviewed. Denies recent dental work. Labs and medications verified. Prolia administered in L arm without incidence. Instructed to call prescribing MD for any concerns or questions and instructed on how to schedule future appts.  Pt vu and discharged in stable condition.

## 2022-09-19 RX ORDER — CETIRIZINE HYDROCHLORIDE 10 MG/1
10 TABLET ORAL DAILY
Qty: 90 TABLET | Refills: 1 | Status: SHIPPED | OUTPATIENT
Start: 2022-09-19 | End: 2023-03-27

## 2022-09-19 RX ORDER — ATORVASTATIN CALCIUM 10 MG/1
10 TABLET, FILM COATED ORAL DAILY
Qty: 90 TABLET | Refills: 3 | Status: SHIPPED | OUTPATIENT
Start: 2022-09-19

## 2022-09-19 NOTE — TELEPHONE ENCOUNTER
Caller: Tereso Allan    Relationship: Self    Best call back number: [phone]     Requested Prescriptions:   Requested Prescriptions     Pending Prescriptions Disp Refills   • atorvastatin (LIPITOR) 10 MG tablet 90 tablet 3     Sig: Take 1 tablet by mouth Daily.   • cetirizine (zyrTEC) 10 MG tablet 90 tablet 1     Sig: Take 1 tablet by mouth Daily.        Pharmacy where request should be sent: [facility] #[id number] [address] AT [facility] [place]  [phone] [facility] [phone] FX     Additional details provided by patient: PATIENT HAS ONE WEEK LEFT    Does the patient have less than a 3 day supply:  [] Yes  [x] No    Kristin Costa Rep   09/19/22 13:49 EDT

## 2022-11-07 DIAGNOSIS — Z00.00 MEDICARE ANNUAL WELLNESS VISIT, SUBSEQUENT: Primary | ICD-10-CM

## 2022-11-07 LAB
ALBUMIN SERPL-MCNC: 4.4 G/DL (ref 3.5–5.2)
ALBUMIN/GLOB SERPL: 2 G/DL
ALP SERPL-CCNC: 64 U/L (ref 39–117)
ALT SERPL-CCNC: 26 U/L (ref 1–33)
AST SERPL-CCNC: 24 U/L (ref 1–32)
BASOPHILS # BLD AUTO: 0.05 10*3/MM3 (ref 0–0.2)
BASOPHILS NFR BLD AUTO: 0.7 % (ref 0–1.5)
BILIRUB SERPL-MCNC: 0.4 MG/DL (ref 0–1.2)
BUN SERPL-MCNC: 16 MG/DL (ref 8–23)
BUN/CREAT SERPL: 14.4 (ref 7–25)
CALCIUM SERPL-MCNC: 9.7 MG/DL (ref 8.6–10.5)
CHLORIDE SERPL-SCNC: 106 MMOL/L (ref 98–107)
CHOLEST SERPL-MCNC: 187 MG/DL (ref 0–200)
CO2 SERPL-SCNC: 30.1 MMOL/L (ref 22–29)
CREAT SERPL-MCNC: 1.11 MG/DL (ref 0.57–1)
EGFRCR SERPLBLD CKD-EPI 2021: 48.8 ML/MIN/1.73
EOSINOPHIL # BLD AUTO: 0.21 10*3/MM3 (ref 0–0.4)
EOSINOPHIL NFR BLD AUTO: 3 % (ref 0.3–6.2)
ERYTHROCYTE [DISTWIDTH] IN BLOOD BY AUTOMATED COUNT: 13 % (ref 12.3–15.4)
GLOBULIN SER CALC-MCNC: 2.2 GM/DL
GLUCOSE SERPL-MCNC: 102 MG/DL (ref 65–99)
HCT VFR BLD AUTO: 40 % (ref 34–46.6)
HDLC SERPL-MCNC: 46 MG/DL (ref 40–60)
HGB BLD-MCNC: 13.6 G/DL (ref 12–15.9)
IMM GRANULOCYTES # BLD AUTO: 0.02 10*3/MM3 (ref 0–0.05)
IMM GRANULOCYTES NFR BLD AUTO: 0.3 % (ref 0–0.5)
LDLC SERPL CALC-MCNC: 118 MG/DL (ref 0–100)
LYMPHOCYTES # BLD AUTO: 2.29 10*3/MM3 (ref 0.7–3.1)
LYMPHOCYTES NFR BLD AUTO: 32.9 % (ref 19.6–45.3)
MCH RBC QN AUTO: 30.6 PG (ref 26.6–33)
MCHC RBC AUTO-ENTMCNC: 34 G/DL (ref 31.5–35.7)
MCV RBC AUTO: 90.1 FL (ref 79–97)
MONOCYTES # BLD AUTO: 0.58 10*3/MM3 (ref 0.1–0.9)
MONOCYTES NFR BLD AUTO: 8.3 % (ref 5–12)
NEUTROPHILS # BLD AUTO: 3.81 10*3/MM3 (ref 1.7–7)
NEUTROPHILS NFR BLD AUTO: 54.8 % (ref 42.7–76)
NRBC BLD AUTO-RTO: 0 /100 WBC (ref 0–0.2)
PLATELET # BLD AUTO: 257 10*3/MM3 (ref 140–450)
POTASSIUM SERPL-SCNC: 5.3 MMOL/L (ref 3.5–5.2)
PROT SERPL-MCNC: 6.6 G/DL (ref 6–8.5)
RBC # BLD AUTO: 4.44 10*6/MM3 (ref 3.77–5.28)
SODIUM SERPL-SCNC: 145 MMOL/L (ref 136–145)
TRIGL SERPL-MCNC: 131 MG/DL (ref 0–150)
VLDLC SERPL CALC-MCNC: 23 MG/DL (ref 5–40)
WBC # BLD AUTO: 6.96 10*3/MM3 (ref 3.4–10.8)

## 2022-11-14 ENCOUNTER — OFFICE VISIT (OUTPATIENT)
Dept: INTERNAL MEDICINE | Facility: CLINIC | Age: 87
End: 2022-11-14

## 2022-11-14 VITALS
BODY MASS INDEX: 23.19 KG/M2 | DIASTOLIC BLOOD PRESSURE: 74 MMHG | HEIGHT: 62 IN | HEART RATE: 76 BPM | WEIGHT: 126 LBS | SYSTOLIC BLOOD PRESSURE: 126 MMHG

## 2022-11-14 DIAGNOSIS — E78.49 OTHER HYPERLIPIDEMIA: ICD-10-CM

## 2022-11-14 DIAGNOSIS — R19.4 BOWEL HABIT CHANGES: Primary | ICD-10-CM

## 2022-11-14 DIAGNOSIS — Z00.00 MEDICARE ANNUAL WELLNESS VISIT, SUBSEQUENT: ICD-10-CM

## 2022-11-14 DIAGNOSIS — M81.0 AGE-RELATED OSTEOPOROSIS WITHOUT CURRENT PATHOLOGICAL FRACTURE: ICD-10-CM

## 2022-11-14 PROCEDURE — 1160F RVW MEDS BY RX/DR IN RCRD: CPT | Performed by: INTERNAL MEDICINE

## 2022-11-14 PROCEDURE — 1170F FXNL STATUS ASSESSED: CPT | Performed by: INTERNAL MEDICINE

## 2022-11-14 PROCEDURE — 1159F MED LIST DOCD IN RCRD: CPT | Performed by: INTERNAL MEDICINE

## 2022-11-14 PROCEDURE — G0439 PPPS, SUBSEQ VISIT: HCPCS | Performed by: INTERNAL MEDICINE

## 2022-11-14 NOTE — PROGRESS NOTES
The ABCs of the Annual Wellness Visit  Subsequent Medicare Wellness Visit    Chief Complaint   Patient presents with   • Medicare Wellness-subsequent      Subjective    History of Present Illness:  Tereso Allan is a 87 y.o. female who presents for a Subsequent Medicare Wellness Visit.  Fell sitting down on a roller chair 2 days ago- went back- hit head on a plastic container.  No LOC, no headache or light sensitivity since. No bleeding.    Stool changes persist- over a year- several days of normal, daily BM, then none then loose stools with some leakage then none then back to normal.  Was on stool softener but didn't change the cycle at all. She eats no different and feels no different during any of these cycles.     The following portions of the patient's history were reviewed and   updated as appropriate: allergies, current medications, past family history, past medical history, past social history, past surgical history and problem list.    Compared to one year ago, the patient feels her physical   health is the same.    Compared to one year ago, the patient feels her mental   health is the same.    Recent Hospitalizations:  She was not admitted to the hospital during the last year.       Current Medical Providers:  Patient Care Team:  Thuy Garza MD as PCP - General (Internal Medicine)    Outpatient Medications Prior to Visit   Medication Sig Dispense Refill   • atorvastatin (LIPITOR) 10 MG tablet Take 1 tablet by mouth Daily. 90 tablet 3   • cetirizine (zyrTEC) 10 MG tablet Take 1 tablet by mouth Daily. 90 tablet 1   • Cholecalciferol 25 MCG (1000 UT) capsule Take 1,000 Units by mouth Daily.     • multivitamin with minerals tablet tablet Take 1 tablet by mouth Daily.       No facility-administered medications prior to visit.       No opioid medication identified on active medication list. I have reviewed chart for other potential  high risk medication/s and harmful drug interactions in the  "elderly.          Aspirin is not on active medication list.  Aspirin use is not indicated based on review of current medical condition/s. Risk of harm outweighs potential benefits.  .    Patient Active Problem List   Diagnosis   • Other hyperlipidemia   • Osteoporosis     Advance Care Planning  Advance Directive is not on file.  ACP discussion was held with the patient during this visit. Patient does not have an advance directive, information provided.    Review of Systems   HENT: Negative for hearing loss and trouble swallowing.    Eyes: Negative for visual disturbance.   Respiratory: Negative for cough and shortness of breath.    Cardiovascular: Negative for chest pain and leg swelling.   Gastrointestinal: Negative for abdominal pain.        Change as in HPI   Genitourinary: Negative for difficulty urinating.   Musculoskeletal: Negative for arthralgias and back pain.   Psychiatric/Behavioral: Negative for dysphoric mood.        Objective    Vitals:    11/14/22 0948   BP: 126/74   Pulse: 76   Weight: 57.2 kg (126 lb)   Height: 158.1 cm (62.25\")     Estimated body mass index is 22.86 kg/m² as calculated from the following:    Height as of this encounter: 158.1 cm (62.25\").    Weight as of this encounter: 57.2 kg (126 lb).    BMI is within normal parameters. No other follow-up for BMI required.      Does the patient have evidence of cognitive impairment? No    Physical Exam  Constitutional:       Appearance: Normal appearance.   Cardiovascular:      Rate and Rhythm: Normal rate and regular rhythm.   Pulmonary:      Effort: Pulmonary effort is normal.   Chest:   Breasts:     Right: Normal.      Left: Normal.   Abdominal:      General: Abdomen is flat. Bowel sounds are normal.      Tenderness: There is no abdominal tenderness.   Lymphadenopathy:      Cervical: No cervical adenopathy.       Lab Results   Component Value Date    CHLPL 187 11/07/2022    TRIG 131 11/07/2022    HDL 46 11/07/2022     (H) 11/07/2022    " VLDL 23 11/07/2022            HEALTH RISK ASSESSMENT    Smoking Status:  Social History     Tobacco Use   Smoking Status Never   Smokeless Tobacco Never     Alcohol Consumption:  Social History     Substance and Sexual Activity   Alcohol Use No     Fall Risk Screen:    CHRISTIANA Fall Risk Assessment was completed, and patient is at MODERATE risk for falls. Assessment completed on:11/14/2022    Depression Screening:  PHQ-2/PHQ-9 Depression Screening 11/14/2022   Retired PHQ-9 Total Score -   Retired Total Score -   Little Interest or Pleasure in Doing Things 0-->not at all   Feeling Down, Depressed or Hopeless 0-->not at all   PHQ-9: Brief Depression Severity Measure Score 0       Health Habits and Functional and Cognitive Screening:  Functional & Cognitive Status 11/14/2022   Do you have difficulty preparing food and eating? No   Do you have difficulty bathing yourself, getting dressed or grooming yourself? No   Do you have difficulty using the toilet? No   Do you have difficulty moving around from place to place? No   Do you have trouble with steps or getting out of a bed or a chair? No   Current Diet Well Balanced Diet   Dental Exam Up to date   Eye Exam Up to date   Exercise (times per week) 0 times per week   Current Exercises Include No Regular Exercise   Current Exercise Activities Include -   Do you need help using the phone?  No   Are you deaf or do you have serious difficulty hearing?  No   Do you need help with transportation? No   Do you need help shopping? No   Do you need help preparing meals?  No   Do you need help with housework?  No   Do you need help with laundry? No   Do you need help taking your medications? No   Do you need help managing money? No   Do you ever drive or ride in a car without wearing a seat belt? -   Have you felt unusual stress, anger or loneliness in the last month? No   Who do you live with? Alone   If you need help, do you have trouble finding someone available to you? No   Have  you been bothered in the last four weeks by sexual problems? No   Do you have difficulty concentrating, remembering or making decisions? No       Age-appropriate Screening Schedule:  Refer to the list below for future screening recommendations based on patient's age, sex and/or medical conditions. Orders for these recommended tests are listed in the plan section. The patient has been provided with a written plan.    Health Maintenance   Topic Date Due   • TDAP/TD VACCINES (2 - Td or Tdap) 08/29/2022   • LIPID PANEL  11/07/2023   • DXA SCAN  11/11/2023   • INFLUENZA VACCINE  Completed              Assessment & Plan   CMS Preventative Services Quick Reference  Risk Factors Identified During Encounter  Immunizations Discussed/Encouraged (specific Immunizations; Tdap  The above risks/problems have been discussed with the patient.  Follow up actions/plans if indicated are seen below in the Assessment/Plan Section.  Pertinent information has been shared with the patient in the After Visit Summary.    Diagnoses and all orders for this visit:    1. Bowel habit changes (Primary)  Comments:  at least 18 months now, unchanged- will add fiber supplement    2. Other hyperlipidemia  Comments:  stable, no change in meds.     3. Age-related osteoporosis without current pathological fracture  Comments:  doing well with Prolia- suspect change in BMD related to delay in admin, will recheck in 1 year instead of 2    4. Medicare annual wellness visit, subsequent  Comments:  certainly doing great at 88 yo- no changes indicated.  Continue her healthy habits - walk for exercise.  Recheck 6m        Follow Up:   Return in about 6 months (around 5/14/2023) for Recheck, Lab Before FUP.     An After Visit Summary and PPPS were made available to the patient.

## 2023-03-06 DIAGNOSIS — M81.0 AGE-RELATED OSTEOPOROSIS WITHOUT CURRENT PATHOLOGICAL FRACTURE: Primary | ICD-10-CM

## 2023-03-09 ENCOUNTER — INFUSION (OUTPATIENT)
Dept: ONCOLOGY | Facility: HOSPITAL | Age: 88
End: 2023-03-09
Payer: MEDICARE

## 2023-03-09 ENCOUNTER — LAB (OUTPATIENT)
Dept: OTHER | Facility: HOSPITAL | Age: 88
End: 2023-03-09
Payer: MEDICARE

## 2023-03-09 VITALS — RESPIRATION RATE: 18 BRPM | HEIGHT: 63 IN | TEMPERATURE: 97.3 F | BODY MASS INDEX: 22.68 KG/M2

## 2023-03-09 DIAGNOSIS — M81.0 AGE-RELATED OSTEOPOROSIS WITHOUT CURRENT PATHOLOGICAL FRACTURE: Primary | ICD-10-CM

## 2023-03-09 DIAGNOSIS — M81.0 AGE-RELATED OSTEOPOROSIS WITHOUT CURRENT PATHOLOGICAL FRACTURE: ICD-10-CM

## 2023-03-09 LAB
25(OH)D3 SERPL-MCNC: 41.5 NG/ML (ref 30–100)
ALBUMIN SERPL-MCNC: 4.2 G/DL (ref 3.5–5.2)
ALBUMIN/GLOB SERPL: 1.4 G/DL
ALP SERPL-CCNC: 61 U/L (ref 39–117)
ALT SERPL W P-5'-P-CCNC: 22 U/L (ref 1–33)
ANION GAP SERPL CALCULATED.3IONS-SCNC: 7 MMOL/L (ref 5–15)
AST SERPL-CCNC: 25 U/L (ref 1–32)
BILIRUB SERPL-MCNC: 0.5 MG/DL (ref 0–1.2)
BUN SERPL-MCNC: 13 MG/DL (ref 8–23)
BUN/CREAT SERPL: 11.5 (ref 7–25)
CALCIUM SPEC-SCNC: 9.4 MG/DL (ref 8.6–10.5)
CHLORIDE SERPL-SCNC: 103 MMOL/L (ref 98–107)
CO2 SERPL-SCNC: 31 MMOL/L (ref 22–29)
CREAT SERPL-MCNC: 1.13 MG/DL (ref 0.57–1)
EGFRCR SERPLBLD CKD-EPI 2021: 47.8 ML/MIN/1.73
GLOBULIN UR ELPH-MCNC: 2.9 GM/DL
GLUCOSE SERPL-MCNC: 93 MG/DL (ref 65–99)
MAGNESIUM SERPL-MCNC: 2.2 MG/DL (ref 1.6–2.4)
PHOSPHATE SERPL-MCNC: 3.6 MG/DL (ref 2.5–4.5)
POTASSIUM SERPL-SCNC: 4.7 MMOL/L (ref 3.5–5.2)
PROT SERPL-MCNC: 7.1 G/DL (ref 6–8.5)
SODIUM SERPL-SCNC: 141 MMOL/L (ref 136–145)

## 2023-03-09 PROCEDURE — 25010000002 DENOSUMAB 60 MG/ML SOLUTION PREFILLED SYRINGE: Performed by: INTERNAL MEDICINE

## 2023-03-09 PROCEDURE — 83735 ASSAY OF MAGNESIUM: CPT | Performed by: INTERNAL MEDICINE

## 2023-03-09 PROCEDURE — 80053 COMPREHEN METABOLIC PANEL: CPT | Performed by: INTERNAL MEDICINE

## 2023-03-09 PROCEDURE — 96372 THER/PROPH/DIAG INJ SC/IM: CPT

## 2023-03-09 PROCEDURE — 82306 VITAMIN D 25 HYDROXY: CPT | Performed by: INTERNAL MEDICINE

## 2023-03-09 PROCEDURE — 84100 ASSAY OF PHOSPHORUS: CPT | Performed by: INTERNAL MEDICINE

## 2023-03-09 RX ADMIN — DENOSUMAB 60 MG: 60 INJECTION SUBCUTANEOUS at 13:45

## 2023-03-27 RX ORDER — CETIRIZINE HYDROCHLORIDE 10 MG/1
10 TABLET ORAL DAILY
Qty: 90 TABLET | Refills: 1 | Status: SHIPPED | OUTPATIENT
Start: 2023-03-27

## 2023-05-08 DIAGNOSIS — E78.49 OTHER HYPERLIPIDEMIA: Primary | ICD-10-CM

## 2023-05-08 LAB
CHOLEST SERPL-MCNC: 161 MG/DL (ref 0–200)
HDLC SERPL-MCNC: 42 MG/DL (ref 40–60)
LDLC SERPL CALC-MCNC: 97 MG/DL (ref 0–100)
TRIGL SERPL-MCNC: 125 MG/DL (ref 0–150)
VLDLC SERPL CALC-MCNC: 22 MG/DL (ref 5–40)

## 2023-05-15 ENCOUNTER — OFFICE VISIT (OUTPATIENT)
Dept: INTERNAL MEDICINE | Facility: CLINIC | Age: 88
End: 2023-05-15
Payer: MEDICARE

## 2023-05-15 VITALS
HEART RATE: 76 BPM | DIASTOLIC BLOOD PRESSURE: 84 MMHG | BODY MASS INDEX: 22.86 KG/M2 | HEIGHT: 63 IN | WEIGHT: 129 LBS | SYSTOLIC BLOOD PRESSURE: 134 MMHG

## 2023-05-15 DIAGNOSIS — M81.0 AGE-RELATED OSTEOPOROSIS WITHOUT CURRENT PATHOLOGICAL FRACTURE: Chronic | ICD-10-CM

## 2023-05-15 DIAGNOSIS — G47.62 NOCTURNAL LEG CRAMPS: Chronic | ICD-10-CM

## 2023-05-15 DIAGNOSIS — E78.49 OTHER HYPERLIPIDEMIA: Primary | Chronic | ICD-10-CM

## 2023-05-15 PROCEDURE — 99214 OFFICE O/P EST MOD 30 MIN: CPT | Performed by: INTERNAL MEDICINE

## 2023-05-15 NOTE — PROGRESS NOTES
"Chief Complaint  Hyperlipidemia    Subjective        Tereso Allan presents to [facility]  History of Present Illness has been having more cramping in her legs and toes - during the night- can wake her during the night.  Worried that it could be related to the Prolia. She has taken Reclast in the past and now Prolia.   Walks most days for exercise- up an down hallway     Objective   Vital Signs:  /84   Pulse 76   Ht 158.8 cm (62.5\")   Wt 58.5 kg (129 lb)   BMI 23.22 kg/m²   Estimated body mass index is 23.22 kg/m² as calculated from the following:    Height as of this encounter: 158.8 cm (62.5\").    Weight as of this encounter: 58.5 kg (129 lb).       BMI is within normal parameters. No other follow-up for BMI required.      Physical Exam  Constitutional:       Appearance: Normal appearance.   Cardiovascular:      Rate and Rhythm: Normal rate and regular rhythm.      Pulses: Normal pulses.   Musculoskeletal:      Right lower leg: No edema.      Left lower leg: No edema.   Skin:     General: Skin is warm.        Result Review :  The following data was reviewed by: Thuy Garza MD on 05/15/2023:  Common labs        11/7/2022    10:01 3/9/2023    12:33 5/8/2023    09:59   Common Labs   Glucose 102   93      BUN 16   13      Creatinine 1.11   1.13      Sodium 145   141      Potassium 5.3   4.7      Chloride 106   103      Calcium 9.7   9.4      Total Protein 6.6       Albumin 4.40   4.2      Total Bilirubin 0.4   0.5      Alkaline Phosphatase 64   61      AST (SGOT) 24   25      ALT (SGPT) 26   22      WBC 6.96       Hemoglobin 13.6       Hematocrit 40.0       Platelets 257       Total Cholesterol 187    161     Triglycerides 131    125     HDL Cholesterol 46    42     LDL Cholesterol  118    97                    Assessment and Plan   Diagnoses and all orders for this visit:    1. Other hyperlipidemia (Primary)  Comments:  at goal- no change.     2. Age-related osteoporosis " without current pathological fracture  Comments:  check dexa- reassured Prolia not likely causing cramps.   Orders:  -     DEXA Bone Density Axial; Future    3. Nocturnal leg cramps  Comments:  start with hydration, stretching when she gets into bed.  Keep exercising.              Follow Up   Return in about 6 months (around 11/15/2023) for Medicare Wellness.  Patient was given instructions and counseling regarding her condition or for health maintenance advice. Please see specific information pulled into the AVS if appropriate.

## 2023-09-11 ENCOUNTER — LAB (OUTPATIENT)
Dept: OTHER | Facility: HOSPITAL | Age: 88
End: 2023-09-11
Payer: MEDICARE

## 2023-09-11 ENCOUNTER — INFUSION (OUTPATIENT)
Dept: ONCOLOGY | Facility: HOSPITAL | Age: 88
End: 2023-09-11
Payer: MEDICARE

## 2023-09-11 VITALS — RESPIRATION RATE: 15 BRPM | TEMPERATURE: 96.9 F | HEIGHT: 63 IN | BODY MASS INDEX: 23.22 KG/M2

## 2023-09-11 DIAGNOSIS — M81.0 AGE-RELATED OSTEOPOROSIS WITHOUT CURRENT PATHOLOGICAL FRACTURE: ICD-10-CM

## 2023-09-11 DIAGNOSIS — M81.0 AGE-RELATED OSTEOPOROSIS WITHOUT CURRENT PATHOLOGICAL FRACTURE: Primary | ICD-10-CM

## 2023-09-11 LAB
25(OH)D3 SERPL-MCNC: 44.6 NG/ML (ref 30–100)
ALBUMIN SERPL-MCNC: 4.3 G/DL (ref 3.5–5.2)
ALBUMIN/GLOB SERPL: 1.4 G/DL
ALP SERPL-CCNC: 114 U/L (ref 39–117)
ALT SERPL W P-5'-P-CCNC: 82 U/L (ref 1–33)
ANION GAP SERPL CALCULATED.3IONS-SCNC: 8.6 MMOL/L (ref 5–15)
AST SERPL-CCNC: 39 U/L (ref 1–32)
BILIRUB SERPL-MCNC: 0.6 MG/DL (ref 0–1.2)
BUN SERPL-MCNC: 16 MG/DL (ref 8–23)
BUN/CREAT SERPL: 15.2 (ref 7–25)
CALCIUM SPEC-SCNC: 9.6 MG/DL (ref 8.6–10.5)
CHLORIDE SERPL-SCNC: 104 MMOL/L (ref 98–107)
CO2 SERPL-SCNC: 27.4 MMOL/L (ref 22–29)
CREAT SERPL-MCNC: 1.05 MG/DL (ref 0.57–1)
EGFRCR SERPLBLD CKD-EPI 2021: 51.9 ML/MIN/1.73
GLOBULIN UR ELPH-MCNC: 3 GM/DL
GLUCOSE SERPL-MCNC: 96 MG/DL (ref 65–99)
MAGNESIUM SERPL-MCNC: 2.3 MG/DL (ref 1.6–2.4)
PHOSPHATE SERPL-MCNC: 3.4 MG/DL (ref 2.5–4.5)
POTASSIUM SERPL-SCNC: 4.2 MMOL/L (ref 3.5–5.2)
PROT SERPL-MCNC: 7.3 G/DL (ref 6–8.5)
SODIUM SERPL-SCNC: 140 MMOL/L (ref 136–145)

## 2023-09-11 PROCEDURE — 82306 VITAMIN D 25 HYDROXY: CPT | Performed by: INTERNAL MEDICINE

## 2023-09-11 PROCEDURE — 83735 ASSAY OF MAGNESIUM: CPT | Performed by: INTERNAL MEDICINE

## 2023-09-11 PROCEDURE — 84100 ASSAY OF PHOSPHORUS: CPT | Performed by: INTERNAL MEDICINE

## 2023-09-11 PROCEDURE — 96372 THER/PROPH/DIAG INJ SC/IM: CPT

## 2023-09-11 PROCEDURE — 25010000002 DENOSUMAB 60 MG/ML SOLUTION PREFILLED SYRINGE: Performed by: INTERNAL MEDICINE

## 2023-09-11 PROCEDURE — 80053 COMPREHEN METABOLIC PANEL: CPT | Performed by: INTERNAL MEDICINE

## 2023-09-11 RX ADMIN — DENOSUMAB 60 MG: 60 INJECTION SUBCUTANEOUS at 10:18

## 2023-09-11 NOTE — NURSING NOTE
Arrived  for prolia injection. Indication and side effects reviewed. Denies recent dental work. Labs and medications verified. Pt stated she has been taking tylenol for the past few weeks due to knee pain. Pt instructed to avoid Tylenol due to elevated liver enzymes and to reach out to her PCP regarding the liver enzymes and tylenol. Prolia administered in L arm without incidence. Instructed to call prescribing MD for any concerns or questions and instructed on how to schedule future appts.  Pt vu and discharged in stable condition.

## 2023-09-18 RX ORDER — ATORVASTATIN CALCIUM 10 MG/1
10 TABLET, FILM COATED ORAL DAILY
Qty: 90 TABLET | Refills: 3 | Status: SHIPPED | OUTPATIENT
Start: 2023-09-18

## 2023-10-06 ENCOUNTER — TELEPHONE (OUTPATIENT)
Dept: INTERNAL MEDICINE | Facility: CLINIC | Age: 88
End: 2023-10-06

## 2023-10-06 NOTE — TELEPHONE ENCOUNTER
"    Caller: Tereso Allan \"Keila\"         Best call back number:     [phone] (Mobile)       What is your medical concern?PAIN BELOW KNEE ON RIGHT SIDE OF LEFT LEG, WHEN FIRST STARTED WAS SEEN BY ORTHOPEDIC  PROVIDER AUG 22 RECEIVED INJECTION AT THAT TIME IN UPPER LEFT HIP FOR BURSITIS, PAIN HAS GOTTEN WORSE SINCE THEN.    ONE IBUPROFEN 2OOMG TAKES PAIN FROM \"8\" TO \"4\" FOR ABOUT 4 HOURS. SHE TRIES TO TAKE ONLY ONCE A DAY. HAS BEEN DOING EXERCISE AND WALKING ON IT TO TRY TO DECREASE PAIN, BUT NOT HELPING.    How long has this issue been going on? 2 WEEKS    Is your provider already aware of this issue?AWARE OF OTHO VISIT ON AUG 22    Have you been treated for this issue? NO  "

## 2023-10-24 RX ORDER — CETIRIZINE HYDROCHLORIDE 10 MG/1
10 TABLET ORAL DAILY
Qty: 90 TABLET | Refills: 1 | Status: SHIPPED | OUTPATIENT
Start: 2023-10-24

## 2023-10-31 NOTE — PLAN OF CARE
Kayla    NAME: Yakelin Bay  AGE: 41 y.o. SEX: female  : 1980     DATE: 2023     Assessment and Plan:   1. Chronic migraine without aura without status migrainosus, not intractable  Already on beta blocker  Add MigraRelief  - Ferritin; Future  - naratriptan (AMERGE) 1 MG TABS; Take 1 tablet (1 mg total) by mouth once as needed for migraine (May repeat dose in 4 hrs if needed. Maximum 5 mg per week.) for up to 1 dose May repeat once after 4 hours  Dispense: 10 tablet; Refill: 5    2. Essential hypertension  Here her bp is low, so will not increase meds. She probably has increased bp at work due to stress. Discussed stress mgmt tech  - CBC and differential; Future    3. Annual physical exam  done  - Lipid panel; Future  - Basic metabolic panel; Future  - CBC and differential; Future  - Ferritin; Future    4. Acne vulgaris    - tretinoin (Retin-A) 0.05 % cream; Apply topically daily at bedtime  Dispense: 45 g; Refill: 3    5. Primary insomnia  Use only sparingly:   - zolpidem (AMBIEN) 5 mg tablet; Take 0.5 tablets (2.5 mg total) by mouth daily at bedtime as needed for sleep Use sparingly. Can be habit forming. Dispense: 15 tablet;  Refill: 0      RTO for myofascial pain    Problem List Items Addressed This Visit        Cardiovascular and Mediastinum    Essential hypertension    Relevant Orders    CBC and differential    Chronic migraine without aura without status migrainosus, not intractable    Relevant Medications    naratriptan (AMERGE) 1 MG TABS    Other Relevant Orders    Ferritin   Other Visit Diagnoses     Annual physical exam    -  Primary    Relevant Orders    Lipid panel    Basic metabolic panel    CBC and differential    Ferritin    Acne vulgaris        Relevant Medications    tretinoin (Retin-A) 0.05 % cream    Primary insomnia        Relevant Medications    zolpidem (AMBIEN) 5 mg tablet Problem: Patient Care Overview  Goal: Plan of Care Review   11/06/18 1023   Coping/Psychosocial   Plan of Care Reviewed With patient;family   OTHER   Outcome Summary Pt and family states good knowledge for hip precautions and AE to assist with adls. Pt may benefit from continued teaching for hip safety and AE teaching with adls          Immunizations and preventive care screenings were discussed with patient today. Appropriate education was printed on patient's after visit summary. Counseling:  Alcohol/drug use: discussed moderation in alcohol intake, the recommendations for healthy alcohol use, and avoidance of illicit drug use. Dental Health: discussed importance of regular tooth brushing, flossing, and dental visits. Injury prevention: discussed safety/seat belts, safety helmets, smoke detectors, carbon dioxide detectors, and smoking near bedding or upholstery. Sexual health: discussed sexually transmitted diseases, partner selection, use of condoms, avoidance of unintended pregnancy, and contraceptive alternatives. Exercise: the importance of regular exercise/physical activity was discussed. Recommend exercise 3-5 times per week for at least 30 minutes. Depression Screening and Follow-up Plan: Patient was screened for depression during today's encounter. They screened negative with a PHQ-2 score of 0. Return in about 2 weeks (around 11/15/2023) for Myofascial/Energy Tx 1/2 hr..     Chief Complaint:     Chief Complaint   Patient presents with   • Physical Exam     Concerned about BP      History of Present Illness:     Adult Annual Physical   Patient here for a comprehensive physical exam. The patient reports problems - life is ok. I'm happy. No acute new issues except bp running high. Home bp 130 - 140/90 - 100's. .Higher bp towards end of day when finishing work. Work is very stressful. Still tension in neck and neck. Samantha Salt especially was helpful. Pains occasionally flare. Self care: does meditation, yoga, pilates. Diet and Physical Activity  Diet/Nutrition: well balanced diet. Breakfast: protein bar, egg. Lunch: salad, chicken, Dinner: double veggie and protein. 5 - 7 servings veggies a day. Exercise:  Pilates, yoga .       Depression Screening  PHQ-2/9 Depression Screening    Little interest or pleasure in doing things: 0 - not at all  Feeling down, depressed, or hopeless: 0 - not at all  Trouble falling or staying asleep, or sleeping too much: 1 - several days  Feeling tired or having little energy: 0 - not at all  Poor appetite or overeatin - not at all  Feeling bad about yourself - or that you are a failure or have let yourself or your family down: 0 - not at all  Trouble concentrating on things, such as reading the newspaper or watching television: 0 - not at all  Moving or speaking so slowly that other people could have noticed. Or the opposite - being so fidgety or restless that you have been moving around a lot more than usual: 0 - not at all  Thoughts that you would be better off dead, or of hurting yourself in some way: 0 - not at all  PHQ-2 Score: 0  PHQ-2 Interpretation: Negative depression screen  PHQ-9 Score: 1   PHQ-9 Interpretation: No or Minimal depression        General Health  Sleep:  always a struggle. Fair. Home late often. [place] Hearing: normal - bilateral.  Vision: no vision problems and wears glasses. Dental: brushes teeth twice daily and water pick nightly . /GYN Health  Patient is: premenopausal  Last menstrual period: 10/22  Contraceptive method:  none . Advanced Care Planning  Do you have an advanced directive? no  Do you have a durable medical power of ?  no     Review of Systems:     Review of Systems  Constitutional: No fever, chills or sweats  HEENT: No eye or ear symptoms, sore throat, congested nose  Cardiorespiratory: No chest pain or shortness of breath  Abdomen/Gastrointestinal: No abdominal pain or unusual change digestion or in bowel movements  Genitourinary: No change in urination  Neuromuscular: No new neurological symptoms or unexplained/new achy joints or muscles  Psych: No suicidal ideation     Past Medical History:     Past Medical History:   Diagnosis Date   • Chronic fatigue     last assessed 18   • Endometriosis    • GERD (gastroesophageal reflux disease) 24 Intermittent, especially when was pregnant. Dawson Hudson now   • Hypoglycemia    • Migraine    • Ovarian cyst    • Scoliosis     last assessed  10/21/13   • Varicella     childhood   • Vitamin D deficiency 22 Takes 1000 units of Vit. D      Past Surgical History:     Past Surgical History:   Procedure Laterality Date   •  SECTION      x2   • ECTOPIC PREGNANCY SURGERY     • LAPAROSCOPY      exploratory   • [place]  DELIVERY ONLY N/A 3/26/2020    Procedure:  SECTION () REPEAT;  Surgeon: Ruth Ann Haro MD;  Location: [facility] ;  Service: Obstetrics   • SALPINGECTOMY        Social History:     Social History     Socioeconomic History   • Marital status: /Civil Union     Spouse name: None   • Number of children: None   • Years of education: None   • Highest education level: None   Occupational History   • Occupation:      Comment: at night   • Occupation: Nurse, 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   Tobacco Use   • Smoking status: Former     Packs/day: 0.25     Years: 2.00     Total pack years: 0.50     Types: Cigarettes   • Smokeless tobacco: Never   Vaping Use   • Vaping Use: Never used   Substance and Sexual Activity   • Alcohol use: Not Currently     Comment: once a year   • Drug use: No   • Sexual activity: Yes     Partners: Male   Other Topics Concern   • None   Social History Narrative   • None     Social Determinants of Health     Financial Resource Strain: Not on file   Food Insecurity: Not on file   Transportation Needs: Not on file   Physical Activity: Not on file   Stress: Not on file   Social Connections: Not on file   Intimate Partner Violence: Not on file   Housing Stability: Not on file      Family History:     Family History   Problem Relation Age of Onset   • Hypertension Mother    • Cancer Mother         skin   • Hyperlipidemia Mother    • Hyperlipidemia Father    • Hypertension Sister    • Heart disease Sister         MVP   • Other Daughter         sickle cell trait   • Diabetes Maternal Grandmother         type 2   • Heart disease Maternal Grandmother    • Cancer Maternal Grandfather         throat, oral, lung   • Stroke Maternal Grandfather    • Cancer Paternal Grandmother         brain   • Heart disease Paternal Grandmother         MI   • Other Paternal Grandfather    • Lung cancer Paternal Grandfather         dementia   • Cancer Maternal Uncle         lung, prostate   • Other Maternal Uncle         agent orange exp., smoker   • Alcohol abuse Maternal Uncle    • Breast cancer Neg Hx       Current Medications:     Current Outpatient Medications   Medication Sig Dispense Refill   • acetaminophen (TYLENOL) 325 mg tablet Take 650 mg by mouth every 6 (six) hours as needed for mild pain     • carvedilol (COREG) 6.25 mg tablet TAKE 1 TABLET(6.25 MG) BY MOUTH TWICE DAILY WITH MEALS     • cyclobenzaprine (FLEXERIL) 10 mg tablet Take 1 tablet (10 mg total) by mouth 3 (three) times a day as needed for muscle spasms May cause drowsiness 15 tablet 0   • ferrous sulfate 325 (65 Fe) mg tablet Take 325 mg by mouth daily with breakfast     • fexofenadine (ALLEGRA) 180 MG tablet Take 1 tablet (180 mg total) by mouth daily 30 tablet 1   • fluticasone (FLONASE) 50 mcg/act nasal spray 2 sprays into each nostril daily Shake liquid and spray 16 g 5   • ibuprofen (MOTRIN) 800 mg tablet Take by mouth every 6 (six) hours as needed for mild pain     • Multiple Vitamin (multivitamin) tablet Take 1 tablet by mouth daily     • naratriptan (AMERGE) 1 MG TABS Take 1 tablet (1 mg total) by mouth once as needed for migraine (May repeat dose in 4 hrs if needed.  Maximum 5 mg per week.) for up to 1 dose May repeat once after 4 hours 10 tablet 5   • Omega-3 Fatty Acids (FISH OIL) 1,000 mg Take 1,000 mg by mouth daily     • tretinoin (Retin-A) 0.05 % cream Apply topically daily at bedtime 45 g 3   • zolpidem (AMBIEN) 5 mg tablet Take 0.5 tablets (2.5 mg total) by mouth daily at bedtime as needed for sleep Use sparingly. Can be habit forming. 15 tablet 0     No current facility-administered medications for this visit. Allergies: Allergies   Allergen Reactions   • Cephalexin      Chest pain   • Levofloxacin Diarrhea   • Sulfa Antibiotics Hives     With swelling      Physical Exam:     /68 (BP Location: Left arm, Patient Position: Sitting, Cuff Size: Standard)   Pulse 97   Ht 5' 3" (1.6 m)   Wt 61.2 kg (135 lb)   SpO2 99%   BMI 23.91 kg/m²     Physical Exam   Alert, No acute distress  /68 (BP Location: Left arm, Patient Position: Sitting, Cuff Size: Standard)   Pulse 97   Ht 5' 3" (1.6 m)   Wt 61.2 kg (135 lb)   SpO2 99%   BMI 23.91 kg/m²     Head, ears, eyes, nose, throat, neck:  Head atraumatic, normocephalic. Conjunctiva clear, pupils equal and reactive to light  Throat: within normal limits  Tympanic membranes clear  Neck supple without concerning nodes, masses or thyromegaly    Respiratory: No respiratory distress. Lungs clear to auscultation  Cardiac: Heart regular rate and rhythm, normal S1 and S2, no murmur  Abdomen benign Soft and non-tender  Extremities: no cyanosis, clubbing or edema        Shannan Blanc.  MD Lm  [address] [facility]

## 2023-11-15 ENCOUNTER — OFFICE VISIT (OUTPATIENT)
Dept: INTERNAL MEDICINE | Facility: CLINIC | Age: 88
End: 2023-11-15
Payer: MEDICARE

## 2023-11-15 VITALS
BODY MASS INDEX: 21.79 KG/M2 | DIASTOLIC BLOOD PRESSURE: 78 MMHG | WEIGHT: 123 LBS | SYSTOLIC BLOOD PRESSURE: 128 MMHG | HEART RATE: 76 BPM | HEIGHT: 63 IN

## 2023-11-15 DIAGNOSIS — E78.49 OTHER HYPERLIPIDEMIA: Primary | Chronic | ICD-10-CM

## 2023-11-15 DIAGNOSIS — M81.0 AGE-RELATED OSTEOPOROSIS WITHOUT CURRENT PATHOLOGICAL FRACTURE: ICD-10-CM

## 2023-11-15 DIAGNOSIS — Z00.00 MEDICARE ANNUAL WELLNESS VISIT, SUBSEQUENT: ICD-10-CM

## 2023-11-15 DIAGNOSIS — R74.01 ELEVATED TRANSAMINASE LEVEL: ICD-10-CM

## 2023-11-15 DIAGNOSIS — M54.16 LUMBAR RADICULAR PAIN: ICD-10-CM

## 2023-11-15 LAB
ALBUMIN SERPL-MCNC: 4.4 G/DL (ref 3.5–5.2)
ALBUMIN/GLOB SERPL: 1.9 G/DL
ALP SERPL-CCNC: 58 U/L (ref 39–117)
ALT SERPL-CCNC: 23 U/L (ref 1–33)
AST SERPL-CCNC: 20 U/L (ref 1–32)
BILIRUB SERPL-MCNC: 0.5 MG/DL (ref 0–1.2)
BUN SERPL-MCNC: 13 MG/DL (ref 8–23)
BUN/CREAT SERPL: 13.3 (ref 7–25)
CALCIUM SERPL-MCNC: 9.9 MG/DL (ref 8.6–10.5)
CHLORIDE SERPL-SCNC: 104 MMOL/L (ref 98–107)
CO2 SERPL-SCNC: 30.1 MMOL/L (ref 22–29)
CREAT SERPL-MCNC: 0.98 MG/DL (ref 0.57–1)
EGFRCR SERPLBLD CKD-EPI 2021: 56.3 ML/MIN/1.73
GLOBULIN SER CALC-MCNC: 2.3 GM/DL
GLUCOSE SERPL-MCNC: 81 MG/DL (ref 65–99)
POTASSIUM SERPL-SCNC: 4.4 MMOL/L (ref 3.5–5.2)
PROT SERPL-MCNC: 6.7 G/DL (ref 6–8.5)
SODIUM SERPL-SCNC: 141 MMOL/L (ref 136–145)

## 2023-11-15 NOTE — PROGRESS NOTES
The ABCs of the Annual Wellness Visit  Subsequent Medicare Wellness Visit    Subjective    Tereso Allan is a 88 y.o. female who presents for a Subsequent Medicare Wellness Visit.    The following portions of the patient's history were reviewed and   updated as appropriate: allergies, current medications, past family history, past medical history, past social history, past surgical history, and problem list.    Compared to one year ago, the patient feels her physical   health is the same.    Compared to one year ago, the patient feels her mental   health is the same.    Recent Hospitalizations:  She was not admitted to the hospital during the last year.       Current Medical Providers:  Patient Care Team:  Thuy Garza MD as PCP - General (Internal Medicine)    Outpatient Medications Prior to Visit   Medication Sig Dispense Refill    cetirizine (zyrTEC) 10 MG tablet TAKE 1 TABLET BY MOUTH DAILY 90 tablet 1    Cholecalciferol 25 MCG (1000 UT) capsule Take 1 capsule by mouth Daily.      multivitamin with minerals tablet tablet Take 1 tablet by mouth Daily.      atorvastatin (LIPITOR) 10 MG tablet TAKE 1 TABLET BY MOUTH DAILY (Patient not taking: Reported on 11/15/2023) 90 tablet 3     No facility-administered medications prior to visit.       No opioid medication identified on active medication list. I have reviewed chart for other potential  high risk medication/s and harmful drug interactions in the elderly.        Aspirin is not on active medication list.  Aspirin use is not indicated based on review of current medical condition/s. Risk of harm outweighs potential benefits.  .    Patient Active Problem List   Diagnosis    Other hyperlipidemia    Osteoporosis     Advance Care Planning   Advance Care Planning     Advance Directive is not on file.  ACP discussion was declined by the patient. Patient has an advance directive (not in EMR), copy requested.     Objective    Vitals:    11/15/23 0951   BP: 128/78  "  Pulse: 76   Weight: 55.8 kg (123 lb)   Height: 158.8 cm (62.5\")     Estimated body mass index is 22.14 kg/m² as calculated from the following:    Height as of this encounter: 158.8 cm (62.5\").    Weight as of this encounter: 55.8 kg (123 lb).    BMI is within normal parameters. No other follow-up for BMI required.      Does the patient have evidence of cognitive impairment? No          HEALTH RISK ASSESSMENT    Smoking Status:  Social History     Tobacco Use   Smoking Status Never   Smokeless Tobacco Never     Alcohol Consumption:  Social History     Substance and Sexual Activity   Alcohol Use No     Fall Risk Screen:    KOADI Fall Risk Assessment was completed, and patient is at LOW risk for falls.Assessment completed on:11/15/2023    Depression Screenin/15/2023     9:53 AM   PHQ-2/PHQ-9 Depression Screening   Little Interest or Pleasure in Doing Things 0-->not at all   Feeling Down, Depressed or Hopeless 0-->not at all   PHQ-9: Brief Depression Severity Measure Score 0       Health Habits and Functional and Cognitive Screenin/15/2023     9:52 AM   Functional & Cognitive Status   Do you have difficulty preparing food and eating? No   Do you have difficulty bathing yourself, getting dressed or grooming yourself? No   Do you have difficulty using the toilet? No   Do you have difficulty moving around from place to place? No   Do you have trouble with steps or getting out of a bed or a chair? No   Current Diet Well Balanced Diet   Dental Exam Up to date   Eye Exam Up to date   Exercise (times per week) 0 times per week   Current Exercises Include No Regular Exercise   Do you need help using the phone?  No   Are you deaf or do you have serious difficulty hearing?  No   Do you need help to go to places out of walking distance? No   Do you need help shopping? No   Do you need help preparing meals?  No   Do you need help with housework?  No   Do you need help with laundry? No   Do you need help " taking your medications? No   Do you need help managing money? No   Do you ever drive or ride in a car without wearing a seat belt? No   Have you felt unusual stress, anger or loneliness in the last month? No   Who do you live with? Alone   If you need help, do you have trouble finding someone available to you? No   Have you been bothered in the last four weeks by sexual problems? No       Age-appropriate Screening Schedule:  Refer to the list below for future screening recommendations based on patient's age, sex and/or medical conditions. Orders for these recommended tests are listed in the plan section. The patient has been provided with a written plan.    Health Maintenance   Topic Date Due    TDAP/TD VACCINES (2 - Td or Tdap) 08/29/2022    ANNUAL WELLNESS VISIT  11/14/2023    LIPID PANEL  05/08/2024    DXA SCAN  11/13/2025    COVID-19 Vaccine  Completed    INFLUENZA VACCINE  Completed    Pneumococcal Vaccine 65+  Completed                  CMS Preventative Services Quick Reference  Risk Factors Identified During Encounter  None Identified  The above risks/problems have been discussed with the patient.  Pertinent information has been shared with the patient in the After Visit Summary.  An After Visit Summary and PPPS were made available to the patient.    Follow Up:   Next Medicare Wellness visit to be scheduled in 1 year.       Additional E&M Note during same encounter follows:  Patient has multiple medical problems which are significant and separately identifiable that require additional work above and beyond the Medicare Wellness Visit.      Chief Complaint  Medicare Wellness-subsequent and Leg Pain    Subjective        HPI  Tereso Allan is also being seen today for elevated LFTs- found on blood work before her Prolia in Sept.  Saw Dr. Calvillo about some knee pain- started about a week after the inj.   He thought related to bursitis in the hip- tx with steroid inj. - still with some nerve pain sensation in  "the L knee- he recommended ibuprofen 200 mg BID for a couple of weeks then stop- she's been off for 5 days and pain seems ok but not great.          Objective   Vital Signs:  /78   Pulse 76   Ht 158.8 cm (62.5\")   Wt 55.8 kg (123 lb)   BMI 22.14 kg/m²     Physical Exam  Constitutional:       Appearance: Normal appearance.   Cardiovascular:      Rate and Rhythm: Normal rate and regular rhythm.   Pulmonary:      Effort: Pulmonary effort is normal.      Breath sounds: Normal breath sounds.   Chest:   Breasts:     Right: Normal.      Left: Normal.   Abdominal:      General: Bowel sounds are normal.      Tenderness: There is no abdominal tenderness.   Musculoskeletal:      Right lower leg: No edema.      Left lower leg: No edema.          The following data was reviewed by: Thuy Garza MD on 11/15/2023:  Common labs          3/9/2023    12:33 5/8/2023    09:59 9/11/2023    09:18   Common Labs   Glucose 93   96    BUN 13   16    Creatinine 1.13   1.05    Sodium 141   140    Potassium 4.7   4.2    Chloride 103   104    Calcium 9.4   9.6    Albumin 4.2   4.3    Total Bilirubin 0.5   0.6    Alkaline Phosphatase 61   114    AST (SGOT) 25   39    ALT (SGPT) 22   82    Total Cholesterol  161     Triglycerides  125     HDL Cholesterol  42     LDL Cholesterol   97       Data reviewed : Consultant notes ortho           Assessment and Plan   Diagnoses and all orders for this visit:    1. Other hyperlipidemia (Primary)  Comments:  holding atorvastatin- will see what cholesterol dose over time may not need.  Orders:  -     Comprehensive Metabolic Panel    2. Lumbar radicular pain  Comments:  I think that is what is causing the L leg/knee issues-  try PT -nsaids sparingly, can try topical.  Not interested in her further looking with MRI at this point  Orders:  -     Ambulatory Referral to Physical Therapy Evaluate and treat    3. Elevated transaminase level  Comments:  new finding- holding atorvastatin- rechecking today. " Has no symptoms    4. Medicare annual wellness visit, subsequent  Comments:  doing really great- feels good, very active.  She is up to date with [facility]. Encouraged continued healthy lifestyle!    5. Age-related osteoporosis without current pathological fracture  Comments:  Dexa reviewed- result encouraging.  Cont Prolia             Follow Up   Return in about 6 months (around 5/15/2024) for Recheck, Lab Today.  Patient was given instructions and counseling regarding her condition or for health maintenance advice. Please see specific information pulled into the AVS if appropriate.

## 2024-03-10 DIAGNOSIS — M81.0 AGE-RELATED OSTEOPOROSIS WITHOUT CURRENT PATHOLOGICAL FRACTURE: Primary | ICD-10-CM

## 2024-03-13 ENCOUNTER — INFUSION (OUTPATIENT)
Dept: ONCOLOGY | Facility: HOSPITAL | Age: 89
End: 2024-03-13
Payer: MEDICARE

## 2024-03-13 ENCOUNTER — LAB (OUTPATIENT)
Dept: OTHER | Facility: HOSPITAL | Age: 89
End: 2024-03-13
Payer: MEDICARE

## 2024-03-13 VITALS — RESPIRATION RATE: 16 BRPM | TEMPERATURE: 97.7 F

## 2024-03-13 DIAGNOSIS — M81.0 AGE-RELATED OSTEOPOROSIS WITHOUT CURRENT PATHOLOGICAL FRACTURE: Primary | ICD-10-CM

## 2024-03-13 DIAGNOSIS — M81.0 AGE-RELATED OSTEOPOROSIS WITHOUT CURRENT PATHOLOGICAL FRACTURE: ICD-10-CM

## 2024-03-13 LAB
25(OH)D3 SERPL-MCNC: 39.7 NG/ML (ref 30–100)
ALBUMIN SERPL-MCNC: 4.2 G/DL (ref 3.5–5.2)
ALBUMIN/GLOB SERPL: 1.4 G/DL
ALP SERPL-CCNC: 61 U/L (ref 39–117)
ALT SERPL W P-5'-P-CCNC: 21 U/L (ref 1–33)
ANION GAP SERPL CALCULATED.3IONS-SCNC: 6 MMOL/L (ref 5–15)
AST SERPL-CCNC: 23 U/L (ref 1–32)
BILIRUB SERPL-MCNC: 0.5 MG/DL (ref 0–1.2)
BUN SERPL-MCNC: 17 MG/DL (ref 8–23)
BUN/CREAT SERPL: 15.5 (ref 7–25)
CALCIUM SPEC-SCNC: 9.7 MG/DL (ref 8.6–10.5)
CHLORIDE SERPL-SCNC: 104 MMOL/L (ref 98–107)
CO2 SERPL-SCNC: 30 MMOL/L (ref 22–29)
CREAT SERPL-MCNC: 1.1 MG/DL (ref 0.57–1)
EGFRCR SERPLBLD CKD-EPI 2021: 49 ML/MIN/1.73
GLOBULIN UR ELPH-MCNC: 3.1 GM/DL
GLUCOSE SERPL-MCNC: 98 MG/DL (ref 65–99)
MAGNESIUM SERPL-MCNC: 2.3 MG/DL (ref 1.6–2.4)
PHOSPHATE SERPL-MCNC: 3.9 MG/DL (ref 2.5–4.5)
POTASSIUM SERPL-SCNC: 4.4 MMOL/L (ref 3.5–5.2)
PROT SERPL-MCNC: 7.3 G/DL (ref 6–8.5)
SODIUM SERPL-SCNC: 140 MMOL/L (ref 136–145)

## 2024-03-13 PROCEDURE — 80053 COMPREHEN METABOLIC PANEL: CPT | Performed by: INTERNAL MEDICINE

## 2024-03-13 PROCEDURE — 84100 ASSAY OF PHOSPHORUS: CPT | Performed by: INTERNAL MEDICINE

## 2024-03-13 PROCEDURE — 83735 ASSAY OF MAGNESIUM: CPT | Performed by: INTERNAL MEDICINE

## 2024-03-13 PROCEDURE — 96372 THER/PROPH/DIAG INJ SC/IM: CPT

## 2024-03-13 PROCEDURE — 25010000002 DENOSUMAB 60 MG/ML SOLUTION PREFILLED SYRINGE: Performed by: INTERNAL MEDICINE

## 2024-03-13 PROCEDURE — 82306 VITAMIN D 25 HYDROXY: CPT | Performed by: INTERNAL MEDICINE

## 2024-03-13 RX ADMIN — DENOSUMAB 60 MG: 60 INJECTION SUBCUTANEOUS at 11:16

## 2024-04-24 RX ORDER — CETIRIZINE HYDROCHLORIDE 10 MG/1
10 TABLET ORAL DAILY
Qty: 90 TABLET | Refills: 1 | Status: SHIPPED | OUTPATIENT
Start: 2024-04-24

## 2024-05-15 ENCOUNTER — OFFICE VISIT (OUTPATIENT)
Dept: INTERNAL MEDICINE | Facility: CLINIC | Age: 89
End: 2024-05-15
Payer: MEDICARE

## 2024-05-15 VITALS
BODY MASS INDEX: 21.44 KG/M2 | HEIGHT: 63 IN | HEART RATE: 74 BPM | SYSTOLIC BLOOD PRESSURE: 148 MMHG | DIASTOLIC BLOOD PRESSURE: 88 MMHG | WEIGHT: 121 LBS

## 2024-05-15 DIAGNOSIS — M81.0 AGE-RELATED OSTEOPOROSIS WITHOUT CURRENT PATHOLOGICAL FRACTURE: ICD-10-CM

## 2024-05-15 DIAGNOSIS — R03.0 ELEVATED BP WITHOUT DIAGNOSIS OF HYPERTENSION: ICD-10-CM

## 2024-05-15 DIAGNOSIS — R19.4 BOWEL HABIT CHANGES: Primary | ICD-10-CM

## 2024-05-15 PROCEDURE — 99214 OFFICE O/P EST MOD 30 MIN: CPT | Performed by: INTERNAL MEDICINE

## 2024-05-15 PROCEDURE — 1159F MED LIST DOCD IN RCRD: CPT | Performed by: INTERNAL MEDICINE

## 2024-05-15 PROCEDURE — 1160F RVW MEDS BY RX/DR IN RCRD: CPT | Performed by: INTERNAL MEDICINE

## 2024-05-15 NOTE — PROGRESS NOTES
"Chief Complaint  Hyperlipidemia    Subjective        Tereso Allan presents to [facility]  History of Present Illness  here for regular f/u- she remains active with [facility]- feels good- no change.   Having more cramps in her legs- mainly below the knee. Cramps in toes can awaken her during the night.   Thinks some veins in her legs look more prominent- there is no tenderness or swelling.   Odd cycle of stool changes- few days of normal stools, few days of nothing, few days of mushy stools then a couple of days of very liquid stools that can be incontinent- then back to normal stools.   Appetite is normal. No increase in bowel gas.     Objective   Vital Signs:  /88   Pulse 74   Ht 158.8 cm (62.5\")   Wt 54.9 kg (121 lb)   BMI 21.78 kg/m²   Estimated body mass index is 21.78 kg/m² as calculated from the following:    Height as of this encounter: 158.8 cm (62.5\").    Weight as of this encounter: 54.9 kg (121 lb).       BMI is within normal parameters. No other follow-up for BMI required.      Physical Exam  Constitutional:       Appearance: Normal appearance.   Cardiovascular:      Rate and Rhythm: Normal rate and regular rhythm.   Abdominal:      General: Abdomen is flat. Bowel sounds are normal. There is no distension.      Palpations: Abdomen is soft.      Tenderness: There is no abdominal tenderness.   Musculoskeletal:      Right lower leg: No edema.      Left lower leg: No edema.        Result Review :                     Assessment and Plan     Diagnoses and all orders for this visit:    1. Bowel habit changes (Primary)  Comments:  minimal intervention- will add fiber supplement daily- monitor response.    2. Age-related osteoporosis without current pathological fracture  Comments:  no trouble with Prolia    3. Elevated BP without diagnosis of hypertension  Comments:  new finding- will start checking and call in 1-2 week with readings.             Follow Up "     Return in about 6 months (around 11/15/2024) for Medicare Wellness.  Patient was given instructions and counseling regarding her condition or for health maintenance advice. Please see specific information pulled into the AVS if appropriate.

## 2024-05-20 ENCOUNTER — TELEPHONE (OUTPATIENT)
Dept: INTERNAL MEDICINE | Facility: CLINIC | Age: 89
End: 2024-05-20
Payer: MEDICARE

## 2024-06-18 ENCOUNTER — OFFICE VISIT (OUTPATIENT)
Dept: INTERNAL MEDICINE | Facility: CLINIC | Age: 89
End: 2024-06-18
Payer: MEDICARE

## 2024-06-18 VITALS — TEMPERATURE: 98.1 F | HEIGHT: 63 IN | BODY MASS INDEX: 21.79 KG/M2 | WEIGHT: 123 LBS

## 2024-06-18 DIAGNOSIS — B02.9 HERPES ZOSTER WITHOUT COMPLICATION: Primary | ICD-10-CM

## 2024-06-18 PROCEDURE — 99213 OFFICE O/P EST LOW 20 MIN: CPT | Performed by: NURSE PRACTITIONER

## 2024-06-18 RX ORDER — VALACYCLOVIR HYDROCHLORIDE 1 G/1
1000 TABLET, FILM COATED ORAL 3 TIMES DAILY
Qty: 21 TABLET | Refills: 0 | Status: SHIPPED | OUTPATIENT
Start: 2024-06-18

## 2024-06-18 NOTE — PROGRESS NOTES
Subjective   Chief Complaint   Patient presents with    Rash     Lower left abdomen started yesterday       History of Present Illness     88 yo female presents with cc rash on left lower abdomen that started yesterday evening. No pain but it does itch. No fever or chills. She has not been outside. Denies recent insect bites.      Patient Active Problem List   Diagnosis    Other hyperlipidemia    Osteoporosis       Allergies   Allergen Reactions    Zoster Vaccine Live Other (See Comments)     Hosptialized, blisters. Physicians did not agree whether it was vaccine or not, pt wants vaccines.    Levofloxacin Rash       Current Outpatient Medications on File Prior to Visit   Medication Sig Dispense Refill    cetirizine (zyrTEC) 10 MG tablet Take 1 tablet by mouth Daily. 90 tablet 1    Cholecalciferol 25 MCG (1000 UT) capsule Take 1 capsule by mouth Daily.      multivitamin with minerals tablet tablet Take 1 tablet by mouth Daily.       No current facility-administered medications on file prior to visit.       Past Medical History:   Diagnosis Date    Encounter for screening mammogram for breast cancer     History of allergy     Hyperlipidemia     Nocturnal leg cramps     Numbness and tingling of right arm     Osteoporosis        Family History   Problem Relation Age of Onset    Uriarte's esophagus Daughter        Social History     Socioeconomic History    Marital status:    Tobacco Use    Smoking status: Never    Smokeless tobacco: Never   Substance and Sexual Activity    Alcohol use: No    Drug use: No    Sexual activity: Defer     Birth control/protection: None       Past Surgical History:   Procedure Laterality Date    APPENDECTOMY      CATARACT EXTRACTION      COLONOSCOPY      EYE SURGERY      FRACTURE SURGERY      HIP ENDOPROSTHESIS Left 11/4/2018    Procedure: LEFT HIP BIPOLAR;  Surgeon: Yousif Lee MD;  Location: [facility];  Service: Orthopedics    JOINT REPLACEMENT      REPLACEMENT TOTAL HIP  "ONCOLOGIC Right          The following portions of the patient's history were reviewed and updated as appropriate: problem list, allergies, current medications, past medical history, and past social history.    ROS    See HPI    Immunization History   Administered Date(s) Administered    COVID-19 (PFIZER) BIVALENT 12+YRS 10/24/2022    COVID-19 (PFIZER) Purple Cap Monovalent 01/15/2021, 02/05/2021, 10/09/2021    COVID-19 F23 (PFIZER) 12YRS+ (COMIRNATY) 10/19/2023    Covid-19 (Pfizer) Gray Cap Monovalent 05/14/2022    FLUAD TRI 65YR+ 09/21/2020    Fluzone High Dose =>65 Years ([employer] ONLY) 10/03/2017, 10/09/2018, 10/01/2019    Fluzone High-Dose 65+yrs 09/21/2020, 09/09/2021, 10/05/2023    Hep A, 2 Dose 04/28/2018    Hepatitis A 04/28/2018, 11/02/2018    Influenza, Unspecified 09/27/2022    Pneumococcal Conjugate 13-Valent (PCV13) 04/28/2016    Pneumococcal Polysaccharide (PPSV23) 10/01/2002    Tdap 08/29/2012       Objective   Vitals:    06/18/24 1345   Temp: 98.1 °F (36.7 °C)   Weight: 55.8 kg (123 lb)   Height: 158.8 cm (62.52\")     Body mass index is 22.12 kg/m².  Physical Exam  Constitutional:       Appearance: Normal appearance.   HENT:      Head: Normocephalic and atraumatic.   Pulmonary:      Effort: Pulmonary effort is normal.   Skin:     Comments: 2 erythematous papulovesicular lesions noted at left lower abdomen.    Neurological:      Mental Status: She is alert.   Psychiatric:         Mood and Affect: Mood normal.         Behavior: Behavior normal.           Assessment & Plan   Diagnoses and all orders for this visit:    1. Herpes zoster without complication (Primary)  Comments:  Valtrex as below. She is using Benadryl cream successfully for pruritus. She will let me know should it become painful.  Orders:  -     valACYclovir (Valtrex) 1000 MG tablet; Take 1 tablet by mouth 3 (Three) Times a Day.  Dispense: 21 tablet; Refill: 0       No follow-ups on file.           "

## 2024-09-16 ENCOUNTER — LAB (OUTPATIENT)
Dept: OTHER | Facility: HOSPITAL | Age: 89
End: 2024-09-16
Payer: MEDICARE

## 2024-09-16 ENCOUNTER — INFUSION (OUTPATIENT)
Dept: ONCOLOGY | Facility: HOSPITAL | Age: 89
End: 2024-09-16
Payer: MEDICARE

## 2024-09-16 DIAGNOSIS — M81.0 AGE-RELATED OSTEOPOROSIS WITHOUT CURRENT PATHOLOGICAL FRACTURE: ICD-10-CM

## 2024-09-16 DIAGNOSIS — M81.0 AGE-RELATED OSTEOPOROSIS WITHOUT CURRENT PATHOLOGICAL FRACTURE: Primary | ICD-10-CM

## 2024-09-16 LAB
25(OH)D3 SERPL-MCNC: 38.6 NG/ML (ref 30–100)
ALBUMIN SERPL-MCNC: 4 G/DL (ref 3.5–5.2)
ALBUMIN/GLOB SERPL: 1.3 G/DL
ALP SERPL-CCNC: 67 U/L (ref 39–117)
ALT SERPL W P-5'-P-CCNC: 20 U/L (ref 1–33)
ANION GAP SERPL CALCULATED.3IONS-SCNC: 8.2 MMOL/L (ref 5–15)
AST SERPL-CCNC: 22 U/L (ref 1–32)
BILIRUB SERPL-MCNC: 0.5 MG/DL (ref 0–1.2)
BUN SERPL-MCNC: 13 MG/DL (ref 8–23)
BUN/CREAT SERPL: 11 (ref 7–25)
CALCIUM SPEC-SCNC: 9.3 MG/DL (ref 8.6–10.5)
CHLORIDE SERPL-SCNC: 104 MMOL/L (ref 98–107)
CO2 SERPL-SCNC: 27.8 MMOL/L (ref 22–29)
CREAT SERPL-MCNC: 1.18 MG/DL (ref 0.57–1)
EGFRCR SERPLBLD CKD-EPI 2021: 44.8 ML/MIN/1.73
GLOBULIN UR ELPH-MCNC: 3.1 GM/DL
GLUCOSE SERPL-MCNC: 97 MG/DL (ref 65–99)
MAGNESIUM SERPL-MCNC: 2.2 MG/DL (ref 1.6–2.4)
PHOSPHATE SERPL-MCNC: 3.8 MG/DL (ref 2.5–4.5)
POTASSIUM SERPL-SCNC: 4.2 MMOL/L (ref 3.5–5.2)
PROT SERPL-MCNC: 7.1 G/DL (ref 6–8.5)
SODIUM SERPL-SCNC: 140 MMOL/L (ref 136–145)

## 2024-09-16 PROCEDURE — 80053 COMPREHEN METABOLIC PANEL: CPT | Performed by: INTERNAL MEDICINE

## 2024-09-16 PROCEDURE — 83735 ASSAY OF MAGNESIUM: CPT | Performed by: INTERNAL MEDICINE

## 2024-09-16 PROCEDURE — 96372 THER/PROPH/DIAG INJ SC/IM: CPT

## 2024-09-16 PROCEDURE — 25010000002 DENOSUMAB 60 MG/ML SOLUTION PREFILLED SYRINGE: Performed by: INTERNAL MEDICINE

## 2024-09-16 PROCEDURE — 82306 VITAMIN D 25 HYDROXY: CPT | Performed by: INTERNAL MEDICINE

## 2024-09-16 PROCEDURE — 84100 ASSAY OF PHOSPHORUS: CPT | Performed by: INTERNAL MEDICINE

## 2024-09-16 RX ADMIN — DENOSUMAB 60 MG: 60 INJECTION SUBCUTANEOUS at 13:21

## 2024-10-21 RX ORDER — CETIRIZINE HYDROCHLORIDE 10 MG/1
10 TABLET ORAL DAILY
Qty: 90 TABLET | Refills: 1 | Status: SHIPPED | OUTPATIENT
Start: 2024-10-21

## 2024-11-21 ENCOUNTER — OFFICE VISIT (OUTPATIENT)
Dept: INTERNAL MEDICINE | Facility: CLINIC | Age: 89
End: 2024-11-21
Payer: MEDICARE

## 2024-11-21 VITALS
BODY MASS INDEX: 20.55 KG/M2 | WEIGHT: 116 LBS | DIASTOLIC BLOOD PRESSURE: 70 MMHG | SYSTOLIC BLOOD PRESSURE: 132 MMHG | HEIGHT: 63 IN | HEART RATE: 76 BPM

## 2024-11-21 DIAGNOSIS — E78.49 OTHER HYPERLIPIDEMIA: Chronic | ICD-10-CM

## 2024-11-21 DIAGNOSIS — Z00.00 MEDICARE ANNUAL WELLNESS VISIT, SUBSEQUENT: Primary | ICD-10-CM

## 2024-11-21 DIAGNOSIS — M81.0 AGE-RELATED OSTEOPOROSIS WITHOUT CURRENT PATHOLOGICAL FRACTURE: Chronic | ICD-10-CM

## 2024-11-21 PROCEDURE — G0439 PPPS, SUBSEQ VISIT: HCPCS | Performed by: INTERNAL MEDICINE

## 2024-11-21 PROCEDURE — 1160F RVW MEDS BY RX/DR IN RCRD: CPT | Performed by: INTERNAL MEDICINE

## 2024-11-21 PROCEDURE — 1159F MED LIST DOCD IN RCRD: CPT | Performed by: INTERNAL MEDICINE

## 2024-11-21 NOTE — PROGRESS NOTES
Subjective   The ABCs of the Annual Wellness Visit  Medicare Wellness Visit      Tereso Allan is a 89 y.o. patient who presents for a Medicare Wellness Visit.    The following portions of the patient's history were reviewed and   updated as appropriate: allergies, current medications, past family history, past medical history, past social history, past surgical history, and problem list.    Compared to one year ago, the patient's physical   health is the same.  Compared to one year ago, the patient's mental   health is the same.    Recent Hospitalizations:  She was not admitted to the hospital during the last year.     Current Medical Providers:  Patient Care Team:  Thuy Garza MD as PCP - General (Internal Medicine)  Thuy Garza MD as Referring Physician (Internal Medicine)    Outpatient Medications Prior to Visit   Medication Sig Dispense Refill    cetirizine (zyrTEC) 10 MG tablet TAKE 1 TABLET BY MOUTH DAILY 90 tablet 1    Cholecalciferol 25 MCG (1000 UT) capsule Take 1 capsule by mouth Daily.      multivitamin with minerals tablet tablet Take 1 tablet by mouth Daily.      valACYclovir (Valtrex) 1000 MG tablet Take 1 tablet by mouth 3 (Three) Times a Day. (Patient not taking: Reported on 11/21/2024) 21 tablet 0     No facility-administered medications prior to visit.     No opioid medication identified on active medication list. I have reviewed chart for other potential  high risk medication/s and harmful drug interactions in the elderly.      Aspirin is not on active medication list.  Aspirin use is not indicated based on review of current medical condition/s. Risk of harm outweighs potential benefits.  .    Patient Active Problem List   Diagnosis    Other hyperlipidemia    Osteoporosis     Advance Care Planning Advance Directive is not on file.  ACP discussion was held with the patient during this visit. Patient has an advance directive (not in EMR), copy requested.            Objective   Vitals:     "24 1324   BP: 132/70   Pulse: 76   Weight: 52.6 kg (116 lb)   Height: 158.8 cm (62.52\")       Estimated body mass index is 20.87 kg/m² as calculated from the following:    Height as of this encounter: 158.8 cm (62.52\").    Weight as of this encounter: 52.6 kg (116 lb).    BMI is within normal parameters. No other follow-up for BMI required.       Does the patient have evidence of cognitive impairment? No                                                                                                Health  Risk Assessment    Smoking Status:  Social History     Tobacco Use   Smoking Status Never   Smokeless Tobacco Never     Alcohol Consumption:  Social History     Substance and Sexual Activity   Alcohol Use No       Fall Risk Screen  STEADI Fall Risk Assessment was completed, and patient is at LOW risk for falls.Assessment completed on:2024    Depression Screening   Little interest or pleasure in doing things? Not at all   Feeling down, depressed, or hopeless? Not at all   PHQ-2 Total Score 0      Health Habits and Functional and Cognitive Screenin/21/2024     1:25 PM   Functional & Cognitive Status   Do you have difficulty preparing food and eating? No   Do you have difficulty bathing yourself, getting dressed or grooming yourself? No   Do you have difficulty using the toilet? No   Do you have difficulty moving around from place to place? No   Do you have trouble with steps or getting out of a bed or a chair? No   Current Diet Well Balanced Diet   Dental Exam Up to date   Eye Exam Up to date   Exercise (times per week) 0 times per week   Current Exercises Include No Regular Exercise   Do you need help using the phone?  No   Are you deaf or do you have serious difficulty hearing?  No   Do you need help to go to places out of walking distance? No   Do you need help shopping? No   Do you need help preparing meals?  No   Do you need help with housework?  No   Do you need help with laundry? No   Do " you need help taking your medications? No   Do you need help managing money? No   Do you ever drive or ride in a car without wearing a seat belt? No   Have you felt unusual stress, anger or loneliness in the last month? No   Who do you live with? Alone   If you need help, do you have trouble finding someone available to you? No   Have you been bothered in the last four weeks by sexual problems? No   Do you have difficulty concentrating, remembering or making decisions? No           Age-appropriate Screening Schedule:  Refer to the list below for future screening recommendations based on patient's age, sex and/or medical conditions. Orders for these recommended tests are listed in the plan section. The patient has been provided with a written plan.    Health Maintenance List  Health Maintenance   Topic Date Due    RSV Vaccine - Adults (1 - 1-dose 75+ series) Never done    TDAP/TD VACCINES (2 - Td or Tdap) 08/29/2022    LIPID PANEL  05/08/2024    ANNUAL WELLNESS VISIT  11/15/2024    DXA SCAN  11/13/2025    COVID-19 Vaccine  Completed    INFLUENZA VACCINE  Completed    Pneumococcal Vaccine 65+  Completed                                                                                                                                                CMS Preventative Services Quick Reference  Risk Factors Identified During Encounter  Immunizations Discussed/Encouraged: RSV (Respiratory Syncytial Virus)    The above risks/problems have been discussed with the patient.  Pertinent information has been shared with the patient in the After Visit Summary.  An After Visit Summary and PPPS were made available to the patient.    Follow Up:   Next Medicare Wellness visit to be scheduled in 1 year.         Additional E&M Note during same encounter follows:  Patient has additional, significant, and separately identifiable condition(s)/problem(s) that require work above and beyond the Medicare Wellness Visit     Chief Complaint  Medicare  "Wellness-subsequent    Subjective   HPI  Keila is also being seen today for additional medical problem/s.   Thinks she just isn't as hungry as she used to be- weight is down a few lbs. She has no complaints- weighs weekly- can fluctuate 112-124. She has good energy, eats what she wants.   Getting Prolia, no issues. Last dexa much improved.   Most nights she is getting cramps in her lower legs and feet    Review of Systems   Constitutional:  Positive for appetite change. Negative for activity change and fatigue.   Respiratory:  Negative for shortness of breath.    Gastrointestinal:  Negative for abdominal pain.   Genitourinary:  Negative for difficulty urinating.              Objective   Vital Signs:  /70   Pulse 76   Ht 158.8 cm (62.52\")   Wt 52.6 kg (116 lb)   BMI 20.87 kg/m²   Physical Exam  Constitutional:       Appearance: Normal appearance.   Cardiovascular:      Rate and Rhythm: Normal rate and regular rhythm.   Pulmonary:      Effort: Pulmonary effort is normal.      Breath sounds: Normal breath sounds.   Musculoskeletal:      Right lower leg: No edema.      Left lower leg: No edema.   Lymphadenopathy:      Cervical: No cervical adenopathy.   Neurological:      General: No focal deficit present.   Psychiatric:         Mood and Affect: Mood normal.         Thought Content: Thought content normal.         The following data was reviewed by: Thuy Garza MD on 11/21/2024:   Dexa, labs     Assessment and Plan               Other hyperlipidemia     Age-related osteoporosis without current pathological fracture    Medicare annual wellness visit, subsequent    Diagnoses and all orders for this visit:    1. Medicare annual wellness visit, subsequent (Primary)  Comments:  Cont to do great- watch wt loss- rec weeky checks- call if cont to lose- vaccines reviewed- rec RSV.    2. Other hyperlipidemia  Comments:  off all meds- doing great- no need to recheck.  Assessment & Plan:         3. Age-related " osteoporosis without current pathological fracture  Comments:  last dexa reviewed- excellent results- will continue with Prolia               Follow Up   Return in about 6 months (around 5/21/2025) for Recheck.  Patient was given instructions and counseling regarding her condition or for health maintenance advice. Please see specific information pulled into the AVS if appropriate.

## 2025-03-12 DIAGNOSIS — M81.0 AGE-RELATED OSTEOPOROSIS WITHOUT CURRENT PATHOLOGICAL FRACTURE: Primary | ICD-10-CM

## 2025-03-17 ENCOUNTER — LAB (OUTPATIENT)
Dept: OTHER | Facility: HOSPITAL | Age: 89
End: 2025-03-17
Payer: MEDICARE

## 2025-03-17 ENCOUNTER — INFUSION (OUTPATIENT)
Dept: ONCOLOGY | Facility: HOSPITAL | Age: 89
End: 2025-03-17
Payer: MEDICARE

## 2025-03-17 DIAGNOSIS — M81.0 AGE-RELATED OSTEOPOROSIS WITHOUT CURRENT PATHOLOGICAL FRACTURE: ICD-10-CM

## 2025-03-17 DIAGNOSIS — M81.0 AGE-RELATED OSTEOPOROSIS WITHOUT CURRENT PATHOLOGICAL FRACTURE: Primary | ICD-10-CM

## 2025-03-17 LAB
25(OH)D3 SERPL-MCNC: 43.4 NG/ML (ref 30–100)
ALBUMIN SERPL-MCNC: 4.1 G/DL (ref 3.5–5.2)
ALBUMIN/GLOB SERPL: 1.2 G/DL
ALP SERPL-CCNC: 60 U/L (ref 39–117)
ALT SERPL W P-5'-P-CCNC: 23 U/L (ref 1–33)
ANION GAP SERPL CALCULATED.3IONS-SCNC: 7 MMOL/L (ref 5–15)
AST SERPL-CCNC: 28 U/L (ref 1–32)
BILIRUB SERPL-MCNC: 0.5 MG/DL (ref 0–1.2)
BUN SERPL-MCNC: 15 MG/DL (ref 8–23)
BUN/CREAT SERPL: 13.6 (ref 7–25)
CALCIUM SPEC-SCNC: 9.5 MG/DL (ref 8.6–10.5)
CHLORIDE SERPL-SCNC: 103 MMOL/L (ref 98–107)
CO2 SERPL-SCNC: 29 MMOL/L (ref 22–29)
CREAT SERPL-MCNC: 1.1 MG/DL (ref 0.57–1)
EGFRCR SERPLBLD CKD-EPI 2021: 48.7 ML/MIN/1.73
GLOBULIN UR ELPH-MCNC: 3.3 GM/DL
GLUCOSE SERPL-MCNC: 94 MG/DL (ref 65–99)
MAGNESIUM SERPL-MCNC: 2.4 MG/DL (ref 1.6–2.4)
PHOSPHATE SERPL-MCNC: 3.6 MG/DL (ref 2.5–4.5)
POTASSIUM SERPL-SCNC: 4.4 MMOL/L (ref 3.5–5.2)
PROT SERPL-MCNC: 7.4 G/DL (ref 6–8.5)
SODIUM SERPL-SCNC: 139 MMOL/L (ref 136–145)

## 2025-03-17 PROCEDURE — 82306 VITAMIN D 25 HYDROXY: CPT | Performed by: INTERNAL MEDICINE

## 2025-03-17 PROCEDURE — 84100 ASSAY OF PHOSPHORUS: CPT | Performed by: INTERNAL MEDICINE

## 2025-03-17 PROCEDURE — 25010000002 DENOSUMAB 60 MG/ML SOLUTION PREFILLED SYRINGE: Performed by: INTERNAL MEDICINE

## 2025-03-17 PROCEDURE — 83735 ASSAY OF MAGNESIUM: CPT | Performed by: INTERNAL MEDICINE

## 2025-03-17 PROCEDURE — 80053 COMPREHEN METABOLIC PANEL: CPT | Performed by: INTERNAL MEDICINE

## 2025-03-17 PROCEDURE — 96372 THER/PROPH/DIAG INJ SC/IM: CPT

## 2025-03-17 RX ADMIN — DENOSUMAB 60 MG: 60 INJECTION SUBCUTANEOUS at 10:54

## 2025-03-17 NOTE — NURSING NOTE
Pt arrived for prolia injection. Indication and side effects reviewed. Denies recent dental work. Reviewed labs and verified medications. Pt tolerated injection well without complication. Encouraged pt to call ordering provider for any questions or concerns, and instructed on how to schedule future appts. Pt v/u and was discharged in stable condition.

## 2025-04-22 RX ORDER — CETIRIZINE HYDROCHLORIDE 10 MG/1
10 TABLET ORAL DAILY
Qty: 90 TABLET | Refills: 1 | Status: SHIPPED | OUTPATIENT
Start: 2025-04-22

## 2025-06-02 ENCOUNTER — OFFICE VISIT (OUTPATIENT)
Dept: INTERNAL MEDICINE | Facility: CLINIC | Age: 89
End: 2025-06-02
Payer: MEDICARE

## 2025-06-02 VITALS
RESPIRATION RATE: 16 BRPM | HEIGHT: 63 IN | WEIGHT: 117 LBS | SYSTOLIC BLOOD PRESSURE: 140 MMHG | DIASTOLIC BLOOD PRESSURE: 86 MMHG | BODY MASS INDEX: 20.73 KG/M2 | HEART RATE: 78 BPM

## 2025-06-02 DIAGNOSIS — M81.0 AGE-RELATED OSTEOPOROSIS WITHOUT CURRENT PATHOLOGICAL FRACTURE: Primary | Chronic | ICD-10-CM

## 2025-06-02 DIAGNOSIS — M79.671 RIGHT FOOT PAIN: ICD-10-CM

## 2025-06-02 PROCEDURE — 1159F MED LIST DOCD IN RCRD: CPT | Performed by: INTERNAL MEDICINE

## 2025-06-02 PROCEDURE — 1160F RVW MEDS BY RX/DR IN RCRD: CPT | Performed by: INTERNAL MEDICINE

## 2025-06-02 PROCEDURE — G2211 COMPLEX E/M VISIT ADD ON: HCPCS | Performed by: INTERNAL MEDICINE

## 2025-06-02 PROCEDURE — 99213 OFFICE O/P EST LOW 20 MIN: CPT | Performed by: INTERNAL MEDICINE

## 2025-06-02 NOTE — PROGRESS NOTES
"Chief Complaint  Balance Issues    Subjective        Tereso Allan presents to [facility]  History of Present Illness  having some pain in her R toes- particularly 3/4 and 5. She has a bunion on the same foot that doesn't cause her any issues. There is no pain at night- hurts more with certain shoes.   Otherwise doing great- she remains active, volunteering, etc.     Objective   Vital Signs:  /86   Pulse 78   Resp 16   Ht 158.8 cm (62.52\")   Wt 53.1 kg (117 lb)   BMI 21.05 kg/m²   Estimated body mass index is 21.05 kg/m² as calculated from the following:    Height as of this encounter: 158.8 cm (62.52\").    Weight as of this encounter: 53.1 kg (117 lb).    BMI is within normal parameters. No other follow-up for BMI required.      Physical Exam  Constitutional:       Appearance: Normal appearance.   Cardiovascular:      Rate and Rhythm: Normal rate.   Pulmonary:      Effort: Pulmonary effort is normal.   Musculoskeletal:      Right lower leg: No edema.      Left lower leg: No edema.      Comments: R 3-5th digits bent forward, no tenderness, no skin breakdown  Normal pulses.         Result Review :                Assessment and Plan   Diagnoses and all orders for this visit:    1. Age-related osteoporosis without current pathological fracture (Primary)  Comments:  No problems with prolia    2. 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  Patient was given instructions and counseling regarding her condition or for health maintenance advice. Please see specific information pulled into the AVS if appropriate.           "

## 2025-06-27 ENCOUNTER — TELEPHONE (OUTPATIENT)
Dept: INTERNAL MEDICINE | Facility: CLINIC | Age: 89
End: 2025-06-27

## 2025-06-27 NOTE — TELEPHONE ENCOUNTER
"Caller: Tereso Allan \"Keila\"    Relationship: Self    Best call back number: [phone]    What medication are you requesting: SOMETHING FOR UTI     What are your current symptoms:  STRONG BURNING SENSATION AND FREQUENT URINATION     How long have you been experiencing symptoms:   COUPLE DAYS.  Have you had these symptoms before:    [] Yes  [x] No    Have you been treated for these symptoms before:   [] Yes  [x] No    If a prescription is needed, what is your preferred pharmacy and phone number: [id number] [address] AT [facility] [address] - [phone] [facility] [phone] FX     Additional notes:    PLEASE CALL TO CONFIRM OR DENY.   "

## 2025-06-27 NOTE — TELEPHONE ENCOUNTER
Pt was offered an appt with other providers today; pt declined wanted to see pcp on Monday. Pt was scheduled with Dr Garza on Monday

## 2025-06-30 ENCOUNTER — OFFICE VISIT (OUTPATIENT)
Dept: INTERNAL MEDICINE | Facility: CLINIC | Age: 89
End: 2025-06-30
Payer: MEDICARE

## 2025-06-30 VITALS
BODY MASS INDEX: 21.71 KG/M2 | HEIGHT: 62 IN | WEIGHT: 118 LBS | HEART RATE: 78 BPM | DIASTOLIC BLOOD PRESSURE: 74 MMHG | SYSTOLIC BLOOD PRESSURE: 128 MMHG

## 2025-06-30 DIAGNOSIS — N30.00 ACUTE CYSTITIS WITHOUT HEMATURIA: Primary | ICD-10-CM

## 2025-06-30 LAB
BILIRUB BLD-MCNC: NEGATIVE MG/DL
CLARITY, POC: ABNORMAL
COLOR UR: ABNORMAL
EXPIRATION DATE: ABNORMAL
GLUCOSE UR STRIP-MCNC: NEGATIVE MG/DL
KETONES UR QL: NEGATIVE
LEUKOCYTE EST, POC: ABNORMAL
Lab: ABNORMAL
NITRITE UR-MCNC: POSITIVE MG/ML
PH UR: 6 [PH] (ref 5–8)
PROT UR STRIP-MCNC: ABNORMAL MG/DL
RBC # UR STRIP: ABNORMAL /UL
SP GR UR: 1.03 (ref 1–1.03)
UROBILINOGEN UR QL: NORMAL

## 2025-06-30 PROCEDURE — 81003 URINALYSIS AUTO W/O SCOPE: CPT | Performed by: INTERNAL MEDICINE

## 2025-06-30 PROCEDURE — 99213 OFFICE O/P EST LOW 20 MIN: CPT | Performed by: INTERNAL MEDICINE

## 2025-06-30 RX ORDER — CEPHALEXIN 500 MG/1
500 CAPSULE ORAL 2 TIMES DAILY
Qty: 14 CAPSULE | Refills: 0 | Status: SHIPPED | OUTPATIENT
Start: 2025-06-30 | End: 2025-07-07

## 2025-06-30 NOTE — PROGRESS NOTES
"Chief Complaint  Urinary Frequency    Subjective        Tereso Allan presents to [facility]  History of Present Illness several days of dysuria, frequency.  She has no history of similar. Has some chills but doesn't feel terrible except irritated.   She is not sexually active and has no real risk factors.     Objective   Vital Signs:  /74   Pulse 78   Ht 157.5 cm (62\")   Wt 53.5 kg (118 lb)   BMI 21.58 kg/m²   Estimated body mass index is 21.58 kg/m² as calculated from the following:    Height as of this encounter: 157.5 cm (62\").    Weight as of this encounter: 53.5 kg (118 lb).    BMI is within normal parameters. No other follow-up for BMI required.      Physical Exam  Constitutional:       Appearance: Normal appearance.   Cardiovascular:      Rate and Rhythm: Normal rate.   Musculoskeletal:      Right lower leg: No edema.      Left lower leg: No edema.        Result Review :                Assessment and Plan   Diagnoses and all orders for this visit:    1. Acute cystitis without hematuria (Primary)  Comments:  Will treat empirically and culture since she hasn't had this before. discussed good hydration, follow course    Other orders  -     cephalexin (KEFLEX) 500 MG capsule; Take 1 capsule by mouth 2 (Two) Times a Day for 7 days.  Dispense: 14 capsule; Refill: 0             Follow Up   No follow-ups on file.  Patient was given instructions and counseling regarding her condition or for health maintenance advice. Please see specific information pulled into the AVS if appropriate.           "

## 2025-07-03 LAB
BACTERIA UR CULT: ABNORMAL
BACTERIA UR CULT: ABNORMAL
OTHER ANTIBIOTIC SUSC ISLT: ABNORMAL

## (undated) DEVICE — ENCORE® LATEX ORTHO SIZE 8, STERILE LATEX POWDER-FREE SURGICAL GLOVE: Brand: ENCORE

## (undated) DEVICE — 3M™ IOBAN™ 2 ANTIMICROBIAL INCISE DRAPE 6650EZ: Brand: IOBAN™ 2

## (undated) DEVICE — CEMENT MIXING SYSTEM WITH FEMORAL BREAKWAY NOZZLE: Brand: REVOLUTION

## (undated) DEVICE — SUT VIC 0 CT1 CR8 18IN J840D

## (undated) DEVICE — PILLW ABD SM

## (undated) DEVICE — GLV SURG BIOGEL LTX PF 8

## (undated) DEVICE — GLV SURG TRIUMPH CLASSIC PF LTX 8.5 STRL

## (undated) DEVICE — BNDG ELAS CO-FLEX SLF ADHR 4IN5YD LF STRL

## (undated) DEVICE — PREP SOL POVIDONE/IODINE BT 4OZ

## (undated) DEVICE — DRSNG GZ PETROLTM XEROFORM CURAD 1X8IN STRL

## (undated) DEVICE — PAD,ABDOMINAL,8"X10",ST,LF: Brand: MEDLINE

## (undated) DEVICE — ANTIBACTERIAL UNDYED BRAIDED (POLYGLACTIN 910), SYNTHETIC ABSORBABLE SUTURE: Brand: COATED VICRYL

## (undated) DEVICE — OPTIFOAM GENTLE SA, POSTOP, 4X8: Brand: MEDLINE

## (undated) DEVICE — SPNG GZ WOVN 4X4IN 12PLY 10/BX STRL

## (undated) DEVICE — SUT MNCRYL 3/0 SH 27 IN Y416H

## (undated) DEVICE — DRAPE,U/ SHT,SPLIT,PLAS,STERIL: Brand: MEDLINE

## (undated) DEVICE — COAXIAL FEMORAL CANAL TIP

## (undated) DEVICE — PREP IM ENCHANCED TOTAL HIP BONE                                    PREPARATION KIT: Brand: PREP-IM

## (undated) DEVICE — APPL CHLORAPREP W/TINT 26ML ORNG

## (undated) DEVICE — DRAPE,REIN 53X77,STERILE: Brand: MEDLINE

## (undated) DEVICE — PK HIP TOTL 40

## (undated) DEVICE — ADHS SKIN DERMABOND TOP ADVANCED